# Patient Record
Sex: FEMALE | Race: WHITE | NOT HISPANIC OR LATINO | Employment: OTHER | URBAN - METROPOLITAN AREA
[De-identification: names, ages, dates, MRNs, and addresses within clinical notes are randomized per-mention and may not be internally consistent; named-entity substitution may affect disease eponyms.]

---

## 2021-11-02 PROBLEM — M16.12 PRIMARY OSTEOARTHRITIS OF LEFT HIP: Status: ACTIVE | Noted: 2021-11-02

## 2021-11-02 PROBLEM — S73.192A ACETABULAR LABRUM TEAR, LEFT, INITIAL ENCOUNTER: Status: ACTIVE | Noted: 2021-11-02

## 2021-11-23 PROBLEM — M41.9 SCOLIOSIS OF LUMBAR SPINE: Status: ACTIVE | Noted: 2021-11-23

## 2022-04-19 PROBLEM — S83.232A COMPLEX TEAR OF MEDIAL MENISCUS OF LEFT KNEE AS CURRENT INJURY: Status: ACTIVE | Noted: 2022-04-19

## 2022-04-19 PROBLEM — S93.422A SPRAIN OF DELTOID LIGAMENT OF LEFT ANKLE: Status: ACTIVE | Noted: 2022-04-19

## 2022-05-05 PROBLEM — M71.319 SYNOVIAL CYST OF SHOULDER: Status: ACTIVE | Noted: 2022-05-05

## 2023-07-24 DIAGNOSIS — N39.0 UTI (URINARY TRACT INFECTION): Primary | ICD-10-CM

## 2023-07-31 ENCOUNTER — OFFICE VISIT (OUTPATIENT)
Dept: UROLOGY | Facility: CLINIC | Age: 68
End: 2023-07-31
Payer: MEDICARE

## 2023-07-31 VITALS
RESPIRATION RATE: 18 BRPM | HEART RATE: 76 BPM | WEIGHT: 118 LBS | DIASTOLIC BLOOD PRESSURE: 62 MMHG | SYSTOLIC BLOOD PRESSURE: 124 MMHG | HEIGHT: 66 IN | BODY MASS INDEX: 18.96 KG/M2

## 2023-07-31 DIAGNOSIS — N39.0 RECURRENT UTI (URINARY TRACT INFECTION): Primary | ICD-10-CM

## 2023-07-31 DIAGNOSIS — N39.0 UTI (URINARY TRACT INFECTION): ICD-10-CM

## 2023-07-31 LAB
BILIRUBIN, UA POC OHS: NEGATIVE
BLOOD, UA POC OHS: ABNORMAL
CLARITY, UA POC OHS: ABNORMAL
COLOR, UA POC OHS: YELLOW
GLUCOSE, UA POC OHS: NEGATIVE
KETONES, UA POC OHS: NEGATIVE
LEUKOCYTES, UA POC OHS: ABNORMAL
NITRITE, UA POC OHS: NEGATIVE
PH, UA POC OHS: 5.5
PROTEIN, UA POC OHS: NEGATIVE
SPECIFIC GRAVITY, UA POC OHS: 1.02
UROBILINOGEN, UA POC OHS: 0.2

## 2023-07-31 PROCEDURE — 99204 OFFICE O/P NEW MOD 45 MIN: CPT | Mod: S$GLB,,, | Performed by: FAMILY MEDICINE

## 2023-07-31 PROCEDURE — 81003 URINALYSIS AUTO W/O SCOPE: CPT | Mod: QW,S$GLB,, | Performed by: FAMILY MEDICINE

## 2023-07-31 PROCEDURE — 81003 POCT URINALYSIS(INSTRUMENT): ICD-10-PCS | Mod: QW,S$GLB,, | Performed by: FAMILY MEDICINE

## 2023-07-31 PROCEDURE — 99204 PR OFFICE/OUTPT VISIT, NEW, LEVL IV, 45-59 MIN: ICD-10-PCS | Mod: S$GLB,,, | Performed by: FAMILY MEDICINE

## 2023-07-31 RX ORDER — SULFAMETHOXAZOLE AND TRIMETHOPRIM 800; 160 MG/1; MG/1
1 TABLET ORAL 2 TIMES DAILY
COMMUNITY
Start: 2023-07-21

## 2023-07-31 RX ORDER — ROPINIROLE 0.25 MG/1
0.25 TABLET, FILM COATED ORAL
COMMUNITY
Start: 2023-07-15

## 2023-07-31 RX ORDER — OMEPRAZOLE 40 MG/1
40 CAPSULE, DELAYED RELEASE ORAL
COMMUNITY
Start: 2023-05-29

## 2023-08-01 ENCOUNTER — TELEPHONE (OUTPATIENT)
Dept: UROLOGY | Facility: CLINIC | Age: 68
End: 2023-08-01
Payer: MEDICARE

## 2023-08-01 RX ORDER — NITROFURANTOIN 25; 75 MG/1; MG/1
100 CAPSULE ORAL DAILY
Qty: 30 CAPSULE | Refills: 11 | Status: SHIPPED | OUTPATIENT
Start: 2023-08-01

## 2023-08-01 NOTE — TELEPHONE ENCOUNTER
Spoke with pt and she states renee Gomes wanted her to call and let us know what she was taking from Dr. Crawford, she states it was bactrim. We will send out prophylactic macrobid. Med sent.     ----- Message from Regi Bruno sent at 8/1/2023  1:00 PM CDT -----  Contact: self  Pt is calling to speak with a nurse to give information. Pt has called once before and hasn't got a response . Please call pt at [phone]

## 2024-03-20 ENCOUNTER — OFFICE VISIT (OUTPATIENT)
Dept: PAIN MEDICINE | Facility: CLINIC | Age: 69
End: 2024-03-20
Payer: MEDICARE

## 2024-03-20 VITALS — BODY MASS INDEX: 19.56 KG/M2 | HEIGHT: 66 IN | WEIGHT: 121.69 LBS | OXYGEN SATURATION: 99 %

## 2024-03-20 DIAGNOSIS — M54.2 NECK PAIN: Primary | ICD-10-CM

## 2024-03-20 DIAGNOSIS — R29.3 POOR POSTURE: Primary | ICD-10-CM

## 2024-03-20 DIAGNOSIS — G89.29 CHRONIC BILATERAL LOW BACK PAIN WITHOUT SCIATICA: ICD-10-CM

## 2024-03-20 DIAGNOSIS — G89.29 CHRONIC PAIN OF BOTH SHOULDERS: ICD-10-CM

## 2024-03-20 DIAGNOSIS — M54.2 CHRONIC NECK PAIN: ICD-10-CM

## 2024-03-20 DIAGNOSIS — M54.50 CHRONIC BILATERAL LOW BACK PAIN WITHOUT SCIATICA: ICD-10-CM

## 2024-03-20 DIAGNOSIS — G89.29 INSOMNIA SECONDARY TO CHRONIC PAIN: ICD-10-CM

## 2024-03-20 DIAGNOSIS — G47.01 INSOMNIA SECONDARY TO CHRONIC PAIN: ICD-10-CM

## 2024-03-20 DIAGNOSIS — M25.511 CHRONIC PAIN OF BOTH SHOULDERS: ICD-10-CM

## 2024-03-20 DIAGNOSIS — M54.12 CERVICAL RADICULOPATHY: ICD-10-CM

## 2024-03-20 DIAGNOSIS — M25.512 CHRONIC PAIN OF BOTH SHOULDERS: ICD-10-CM

## 2024-03-20 DIAGNOSIS — G89.29 CHRONIC NECK PAIN: ICD-10-CM

## 2024-03-20 DIAGNOSIS — M47.812 CERVICAL SPONDYLOSIS: ICD-10-CM

## 2024-03-20 PROBLEM — Q21.12 PFO (PATENT FORAMEN OVALE): Status: ACTIVE | Noted: 2020-10-07

## 2024-03-20 PROBLEM — Z96.641 S/P HIP REPLACEMENT, RIGHT: Status: ACTIVE | Noted: 2020-09-10

## 2024-03-20 PROBLEM — I10 HYPERTENSION: Status: ACTIVE | Noted: 2020-10-07

## 2024-03-20 PROCEDURE — 99205 OFFICE O/P NEW HI 60 MIN: CPT | Mod: S$GLB,,, | Performed by: PHYSICAL MEDICINE & REHABILITATION

## 2024-03-20 RX ORDER — AMITRIPTYLINE HYDROCHLORIDE 25 MG/1
25 TABLET, FILM COATED ORAL
COMMUNITY
End: 2024-05-16

## 2024-03-20 RX ORDER — TIZANIDINE 4 MG/1
4-8 TABLET ORAL NIGHTLY PRN
Qty: 60 TABLET | Refills: 2 | Status: SHIPPED | OUTPATIENT
Start: 2024-03-20 | End: 2024-06-03

## 2024-03-20 RX ORDER — SPIRONOLACTONE 25 MG/1
25 TABLET ORAL DAILY
COMMUNITY

## 2024-03-20 NOTE — PROGRESS NOTES
[facility]      Referring Provider: Eloisa Yusuf Np  [address] [place] [address]    Chief Complaint:   Chief Complaint   Patient presents with    Neck Pain       History of Present Illness: Alexsandra Garcia is a 68 y.o. female referred by Eloisa Yusuf for neck pain.      Onset: 1 year. She had prior C7-T1 anterior fusion   Location: neck  Radiation: back and bilateral shoulders  Timing: intermittent  Quality: Aching, Deep, and Stabbing  Exacerbating Factors: general activity  Alleviating Factors: massage and exercise/therapy  Associated Symptoms: She feels weakness in her arms and legs and numbness in both fingertips. She denies night fever/night sweats, urinary incontinence/change in function, bowel incontinence/change in function, and unexplained weight loss    Functional Limitations: Pain is limiting her driving, sleeping, getting dressed.    Patient has failed > 6 weeks of conservative treatment including: physical therapy and oral medications. She has been in PT for about a year.     Severity: Currently: 4/10   Typical Range: 4-10/10     Exacerbation: 10/10     P = 8  E = 6  G = 6  Baseline PEG Score = 6.67  Current PEG Score: 6.67    Opioid Risk Score         Value Time User    Opioid Risk Score  1 3/20/2024  1:21 PM Ketty Ceballos LPN             Previous Interventions:  -     Previous Therapies:  PT/OT: yes   Relevant Surgery: yes    - C7-T1 anterior fusion   Previous Medications:   - NSAIDS: tylenol.  - Muscle Relaxants: tizanidine   - TCAs: Elavil   - SNRIs: duloxetine   - Topicals:   - Anticonvulsants:    - Opioids: Hydrocodone    Current Pain Medications:  Duloxetine 60 mg  Norco  mg  Zanaflex 2 mg BID  Elavil 25 mg     Blood Thinners: none    Full Medication List:    Current Outpatient Medications:     amitriptyline (ELAVIL) 25 MG tablet, 25 mg., Disp: , Rfl:     DULoxetine (CYMBALTA) 60 MG capsule, Take 60 mg by mouth once daily., Disp: , Rfl:     fluticasone  propionate (FLONASE) 50 mcg/actuation nasal spray, by Each Nostril route., Disp: , Rfl:     HYDROcodone-acetaminophen (NORCO)  mg per tablet, hydrocodone 10 mg-acetaminophen 325 mg tablet, Disp: , Rfl:     hydrOXYzine HCL (ATARAX) 25 MG tablet, Take 25 mg by mouth 3 (three) times daily., Disp: , Rfl:     montelukast (SINGULAIR) 10 mg tablet, Take 10 mg by mouth once daily., Disp: , Rfl:     nitrofurantoin, macrocrystal-monohydrate, (MACROBID) 100 MG capsule, Take 1 capsule (100 mg total) by mouth once daily., Disp: 30 capsule, Rfl: 11    omeprazole (PRILOSEC) 40 MG capsule, Take 40 mg by mouth., Disp: , Rfl:     pantoprazole (PROTONIX) 40 MG tablet, pantoprazole 40 mg tablet,delayed release, Disp: , Rfl:     spironolactone (ALDACTONE) 25 MG tablet, Take 25 mg by mouth once daily., Disp: , Rfl:     busPIRone (BUSPAR) 10 MG tablet, Take 10 mg by mouth 2 (two) times daily., Disp: , Rfl:     rOPINIRole (REQUIP) 0.25 MG tablet, Take 0.25 mg by mouth., Disp: , Rfl:     sulfamethoxazole-trimethoprim 400-80mg (BACTRIM,SEPTRA) 400-80 mg per tablet, Take 1 tablet by mouth once daily., Disp: , Rfl:     sulfamethoxazole-trimethoprim 800-160mg (BACTRIM DS) 800-160 mg Tab, Take 1 tablet by mouth 2 (two) times daily., Disp: , Rfl:     tiZANidine (ZANAFLEX) 4 MG tablet, Take 1-2 tablets (4-8 mg total) by mouth nightly as needed (neck pain)., Disp: 60 tablet, Rfl: 2     Review of Systems: See HPI    Allergies:  Pcn [penicillins]     Medical History:   has a past medical history of Anxiety, Arthritis, GERD (gastroesophageal reflux disease), Pulmonary stenosis, and Urinary tract infection.    Surgical History:   has a past surgical history that includes Hip surgery (Right, 2020); Neck surgery (1979); Neck surgery (1983); Foot surgery (Left, 2019); and Elbow surgery (Right, 1980).    Family History:  family history includes Bladder Cancer in her father; Breast cancer in her mother; Cancer in her father and mother.    Social  "History:   reports that she has never smoked. She has never used smokeless tobacco. She reports current alcohol use. She reports current drug use. Drug: Marijuana.    Physical Exam:  Ht 5' 6" (1.676 m)   Wt 55.2 kg (121 lb 11.2 oz)   SpO2 99%   BMI 19.64 kg/m²   GEN: No acute distress. Calm, comfortable  HENT: Normocephalic, atraumatic, moist mucous membranes  EYE: Anicteric sclera, non-injected.   CV: Non-diaphoretic. Regular Rate. Radial Pulses 2+.  RESP: Breathing comfortably. Chest expansion symmetric.  EXT: No clubbing, cyanosis.   SKIN: Warm, & dry to palpation. No visible rashes or lesions of exposed skin.   PSYCH: Pleasant mood and appropriate affect. Recent and remote memory intact.   GAIT: Independent, normal ambulation  Neck Exam:       Inspection: No erythema, bruising. Forward head and rounded shoulders      Palpation: (+) TTP of b/l cervical paraspinals, trapezius, rhomboids, levators, supraspinatus      ROM:  Limitation in all planes      Provocative Maneuvers:  (-) Spurling's bilaterally  Shoulder Exam:       Inspection: No erythema, bruising.       Palpation: (+) TTP of b/l subacromial space, b/l AC joints, b/l proximal biceps tendons.       ROM:  Limited in abduction, internal rotation b/l      Provocative Maneuvers:  (+) Hawkin's b/l       (+) Empty Can b/l  Lumbar Spine Exam:       Inspection: No erythema, bruising.       Palpation: (+) TTP of lumbar paraspinals and SIJ bilaterally       ROM:  Limited in flexion, extension, lateral bending.    Pain w/ flex > ext      (+) Facet loading bilaterally      (-) Straight Leg Raise bilaterally      (-) MORALES bilaterally  Hip Exam:      Inspection: No gross deformity or apparent leg length discrepancy      Palpation:  No TTP to bilateral greater trochanteric bursas, piriformis.       ROM:  No limitation or pain in internal rotation, external rotation b/l  Neurologic Exam:     Alert. Speech is fluent and appropriate.     Strength: 4/5 in b/l hip " flexion, otherwise 5/5 throughout bilateral upper & lower extremities     Sensation:  Grossly intact to light touch in bilateral upper & lower extremities     Reflexes: 1+ in b/l patella, achilles, biceps, brachioradialis, triceps     Tone: No abnormality appreciated in bilateral upper or lower extremities     (-) Jones bilaterally     No Clonus      Imaging:  - MRI Cervical Spine 2/26/24:  Normal anatomic alignment.  Postop changes from C7-T1 ACDF.  Cervical spinal cord shows normal shape, volume and signal.  C1-C2:Anterior longitudinal interval degenerative arthritis and retrograde and ligamentous thickening. C1-C2 facet arthritis  C2-C3:Hypertrophic degenerative facet arthropathy much worse on the right side. 2 mm disc osteophyte bulge. The ligament flavum thickening is effacing the dorsal subarachnoid space and contacting the cord, but there is adequate CSF dorsal to the cord and overall no cord compression. Severe right foraminal stenosis. Mild left foraminal stenosis.  C3-C4:2 mm disc osteophyte bulge and ligament flavum hypertrophy produce mild central canal stenosis without any cord compression. Bilateral hypertrophic degenerative facet arthropathy and uncovertebral joint osteophytes more evident on the right side produces moderate right and mild left foraminal stenosis.  C4-C5:Moderate desiccation and degenerative loss in height of the disc. Left-sided hypertrophic degenerative facet arthropathy and uncovertebral joint osteophyte produces severe left neural foraminal stenosis. No central canal stenosis.  C5-C6:Moderate desiccation and degenerative loss in height of the disc. A 3.5 mm disc osteophyte bulge is slightly asymmetric to the right side along with ligamentum flavum hypertrophy produces mild/moderate central canal stenosis but no cord compression. Very large right uncovertebral joint osteophyte produces severe right neural foraminal stenosis along with degenerative facet arthropathy. Small left  uncovertebral joint osteophyte producing mild left foraminal stenosis.  C6-C7:Mild degenerative loss in height of the disc. 3 mm disc osteophyte bulge producing mild central canal stenosis without any cord compression. Bilateral uncovertebral joint osteophytes and mild hypertrophic degenerative facet arthropathy, worse on the right produces moderate right and mild left foraminal stenosis.  C7-T1:Central canal and neural foramina are well decompressed.  T1-T2: Mild degenerative facet arthropathy without any stenosis.      -MRI Lumbar Spine 12/22/21  Alignment: Levoscoliosis of 19 degrees is seen with the apex being at approximately the L2 level.  Vertebral bodies: Normal height and marrow signal. No transitional anatomy.  T12-L1: The disc is normal. The facets are normal. The central canal and neural foramen are normally maintained. No displacement or compression of the neural elements are seen.  L1-L2: There is loss of disc space height with desiccation. A concentric bulge measuring approximately 3 mm present. Degenerative changes are present in the facets consisting mainly of hypertrophic changes. There is mild narrowing of the central canal. Mild narrowing of the neural foramen is present bilaterally at the disc level. No displacement or compression of the neural elements are seen.  L2-L3: Loss of disc space height with desiccation is present. There is an approximate 2-3 mm concentric disc bulge. Degenerative changes are present in the facets. Moderate narrowing of the central canal is present. There is mild narrowing of the left neural foramen at the disc level. Moderate narrowing of the right neural foramen at both the infrapedicular and disc level is seen. No displacement or compression of the neural elements are seen.  L3-L4: Loss of disc space height with desiccation is present. There is an approximate 4 to 5 mm eccentric disc bulge lateralizing to the patient's right side. Degenerative changes are present in  "the facets. There is moderate narrowing of the central canal. Moderate to severe narrowing of the right subarticular recess is seen. There is mild narrowing of the right left neural foramen at the disc level. The exiting nerve root is normally maintained. Moderate narrowing of the right neural foramen at both the infrapedicular and disc level is seen. The exiting nerve root on this side appears normally maintained as well.  L4-L5: Loss of disc space height with desiccation is present. Eccentric disc bulge lateralizing to the patient's left side is seen. This measures a maximum of approximately 4 to 5 mm. Degenerative changes are present in the facets consisting mainly of hypertrophic changes. There is mild to moderate narrowing of the central canal. Moderate narrowing of the neural foramen is seen on the left at both the  infrapedicular and disc level. The right neural foramen appears fairly well-maintained. No displacement or compression of the neural elements are seen.  L5-S1: Loss of disc space height with desiccation is seen. There is a disc bulge present measuring approximately 5-6 mm. Degenerative changes are present in the facets consisting mainly of hypertrophic changes. There is mild narrowing of the central canal. Moderate narrowing of the neural foramen is present bilaterally at both the infrapedicular and disc level. No displacement or compression of the neural elements are seen.  Spinal cord: The cord extends down to the L1 level. The cord has a normal size, configuration and signal pattern.  Additional findings: None seen.      Labs:  BMP  No results found for: "NA", "K", "CL", "CO2", "BUN", "CREATININE", "CALCIUM", "ANIONGAP", "EGFRNORACEVR"  No results found for: "ALT", "AST", "GGT", "ALKPHOS", "BILITOT"  No results found for: "PLT"    Assessment:  Alexsandra Garcia is a 68 y.o. female with the following diagnoses based on history, exam, and imaging:    Problem List Items Addressed This Visit  "   None  Visit Diagnoses       Poor posture    -  Primary    Relevant Medications    tiZANidine (ZANAFLEX) 4 MG tablet    Other Relevant Orders    Ambulatory referral/consult to Physical/Occupational Therapy    Chronic bilateral low back pain without sciatica        Relevant Medications    tiZANidine (ZANAFLEX) 4 MG tablet    Other Relevant Orders    Ambulatory referral/consult to Physical/Occupational Therapy    X-Ray Lumbar Spine AP And Lateral    Chronic neck pain        Relevant Medications    tiZANidine (ZANAFLEX) 4 MG tablet    Other Relevant Orders    Ambulatory referral/consult to Physical/Occupational Therapy    X-Ray Cervical Spine AP And Lateral    Insomnia secondary to chronic pain        Relevant Medications    tiZANidine (ZANAFLEX) 4 MG tablet    Cervical spondylosis        Relevant Medications    tiZANidine (ZANAFLEX) 4 MG tablet    Other Relevant Orders    X-Ray Cervical Spine AP And Lateral    Cervical radiculopathy        Relevant Medications    tiZANidine (ZANAFLEX) 4 MG tablet    Other Relevant Orders    X-Ray Cervical Spine AP And Lateral    Chronic pain of both shoulders        Relevant Orders    X-Ray Shoulder 2 or more views Bilat (Completed)            This is a pleasant 68 y.o. lady presenting with:     - Chronic neck pain and bilateral shoulder/arm pains: Prior C7-T1 ACDF. There is severe foraminal stenosis on the right at C5-6 and on the left at C4-5 as well as diffuse facet arthropathy, appears most significant at C4-5 on the left and C5-6 on the right.   - Chronic bilateral shoulder pain: Multifactorial with contributions from C-spine as well. She has signs of shoulder impingement, AC joint arthropathy   - Chronic bilateral low back pain and some posterior thigh pain: I suspect her pain is primarily related to her facet joint arthropathy  - Comorbidities: recurrent UTI on daily abx. GERD. HTN. PFO. Anxiety. Pulmonary stenosis.     Treatment Plan:   - PT/OT/HEP: Refer to PT for neck and  back pain. Discussed benefits of exercise for pain.   - Procedures: Schedule C7-T1 IL JANETTE with local sedation.   - If limited relief, plan for bilateral C4-5, C5-6 diagnostic MBBs.  - Medications:    - Change tizanidine from 2 mg TID to 4-8 mg qHS to help with sleep and pain.   - Imaging: Reviewed. X-ray c-spine, l-spine, shoulders.    - Plan on updating lumbar MRI if no improvement with PT  - Labs: None.    Follow Up: RTC 2 weeks after JANETTE    I spent greater than 60 minutes in total in todays visit including face-to-face time with the patient, and time reviewing records/imaging/labs, and documenting.       Marlene Molina MD  Interventional Pain Medicine

## 2024-03-21 ENCOUNTER — TELEPHONE (OUTPATIENT)
Dept: PAIN MEDICINE | Facility: CLINIC | Age: 69
End: 2024-03-21
Payer: MEDICARE

## 2024-03-21 DIAGNOSIS — M54.12 CERVICAL RADICULOPATHY: Primary | ICD-10-CM

## 2024-04-09 ENCOUNTER — TELEPHONE (OUTPATIENT)
Dept: PAIN MEDICINE | Facility: CLINIC | Age: 69
End: 2024-04-09
Payer: MEDICARE

## 2024-04-10 ENCOUNTER — TELEPHONE (OUTPATIENT)
Dept: PAIN MEDICINE | Facility: CLINIC | Age: 69
End: 2024-04-10
Payer: MEDICARE

## 2024-04-10 NOTE — TELEPHONE ENCOUNTER
----- Message from So Payne sent at 4/10/2024 12:19 PM CDT -----  Contact: self  Type:  Patient Returning Call    Who Called:Alexsandra Garcia  Who Left Message for Patient:yin  Does the patient know what this is regarding?:appt reschedule  Would the patient rather a call back or a response via [employer]?   Best Call Back Number:[phone]  Additional Information: n/a

## 2024-04-22 ENCOUNTER — TELEPHONE (OUTPATIENT)
Dept: PAIN MEDICINE | Facility: CLINIC | Age: 69
End: 2024-04-22

## 2024-04-22 NOTE — TELEPHONE ENCOUNTER
[facility]  [address] [place] [address].   [address]  [place] [address]  Phone: [phone]  Fax: [phone]    History & Physical         Provider: Dr. Marlene Molina    Patient Name: Alexsandra DENNEY (age):1955  68 y.o.           Gender: female   Phone: [phone]     Referring Physician:      Subjective: No health changes since previous encounter. No changes in pain since procedure was scheduled at previous visit. Denies any fevers or infections. Denies any issues with clotting or bleeding.           History:      Full Medication List:    Current Outpatient Medications:     amitriptyline (ELAVIL) 25 MG tablet, 25 mg., Disp: , Rfl:     busPIRone (BUSPAR) 10 MG tablet, Take 10 mg by mouth 2 (two) times daily., Disp: , Rfl:     DULoxetine (CYMBALTA) 60 MG capsule, Take 60 mg by mouth once daily., Disp: , Rfl:     fluticasone propionate (FLONASE) 50 mcg/actuation nasal spray, by Each Nostril route., Disp: , Rfl:     HYDROcodone-acetaminophen (NORCO)  mg per tablet, hydrocodone 10 mg-acetaminophen 325 mg tablet, Disp: , Rfl:     hydrOXYzine HCL (ATARAX) 25 MG tablet, Take 25 mg by mouth 3 (three) times daily., Disp: , Rfl:     montelukast (SINGULAIR) 10 mg tablet, Take 10 mg by mouth once daily., Disp: , Rfl:     nitrofurantoin, macrocrystal-monohydrate, (MACROBID) 100 MG capsule, Take 1 capsule (100 mg total) by mouth once daily., Disp: 30 capsule, Rfl: 11    omeprazole (PRILOSEC) 40 MG capsule, Take 40 mg by mouth., Disp: , Rfl:     pantoprazole (PROTONIX) 40 MG tablet, pantoprazole 40 mg tablet,delayed release, Disp: , Rfl:     rOPINIRole (REQUIP) 0.25 MG tablet, Take 0.25 mg by mouth., Disp: , Rfl:     spironolactone (ALDACTONE) 25 MG tablet, Take 25 mg by mouth once daily., Disp: , Rfl:     sulfamethoxazole-trimethoprim 400-80mg (BACTRIM,SEPTRA) 400-80 mg per tablet, Take 1 tablet by mouth once daily.,  Disp: , Rfl:     sulfamethoxazole-trimethoprim 800-160mg (BACTRIM DS) 800-160 mg Tab, Take 1 tablet by mouth 2 (two) times daily., Disp: , Rfl:     tiZANidine (ZANAFLEX) 4 MG tablet, Take 1-2 tablets (4-8 mg total) by mouth nightly as needed (neck pain)., Disp: 60 tablet, Rfl: 2     Allergies:  Pcn [penicillins]     Medical History:   has a past medical history of Anxiety, Arthritis, GERD (gastroesophageal reflux disease), Pulmonary stenosis, and Urinary tract infection.    Surgical History:   has a past surgical history that includes Hip surgery (Right, 2020); Neck surgery (1979); Neck surgery (1983); Foot surgery (Left, 2019); and Elbow surgery (Right, 1980).    Family History:  family history includes Bladder Cancer in her father; Breast cancer in her mother; Cancer in her father and mother.    Social History:   reports that she has never smoked. She has never used smokeless tobacco. She reports current alcohol use. She reports current drug use. Drug: Marijuana.    Physical Examination:     GENERAL: Well appearing, in no acute distress, alert and oriented. If Abnormal: ___________________________________________    PSYCH:  Mood and affect appropriate.     If Abnormal: ___________________________________________    SKIN: Skin color, texture, turgor normal in procedure area   If Abnormal: ___________________________________________  No rashes or lesions which will impact the procedure.    CV: RRR with palpation of the radial artery.     If Abnormal: ___________________________________________    PULM: No evidence of respiratory difficulty, symmetric chest rise.   If Abnormal: ___________________________________________  Clear to auscultation.    NEURO: Alert. Oriented. Speech fluent and appropriate.    If Abnormal: ___________________________________________     Moving all extremities.    Plan:    Patient has been evaluated immediately prior to procedure and no significant changes in pain or significant medical  changes noted at this time that would interfere with our planned procedure.    Proceed with procedure as planned.       Physician Signature: _____________________________________         Date: ___________   Time: ________

## 2024-04-29 ENCOUNTER — OUTSIDE PLACE OF SERVICE (OUTPATIENT)
Dept: PAIN MEDICINE | Facility: CLINIC | Age: 69
End: 2024-04-29

## 2024-04-29 PROCEDURE — 62321 NJX INTERLAMINAR CRV/THRC: CPT | Mod: ,,, | Performed by: PHYSICAL MEDICINE & REHABILITATION

## 2024-05-16 ENCOUNTER — OFFICE VISIT (OUTPATIENT)
Dept: PAIN MEDICINE | Facility: CLINIC | Age: 69
End: 2024-05-16
Payer: MEDICARE

## 2024-05-16 VITALS
DIASTOLIC BLOOD PRESSURE: 68 MMHG | SYSTOLIC BLOOD PRESSURE: 114 MMHG | WEIGHT: 121.69 LBS | HEIGHT: 66 IN | HEART RATE: 76 BPM | BODY MASS INDEX: 19.56 KG/M2

## 2024-05-16 DIAGNOSIS — G89.29 CHRONIC LEFT-SIDED LOW BACK PAIN WITHOUT SCIATICA: Primary | ICD-10-CM

## 2024-05-16 DIAGNOSIS — M54.2 NECK PAIN: ICD-10-CM

## 2024-05-16 DIAGNOSIS — M53.3 SACROILIAC JOINT DYSFUNCTION: ICD-10-CM

## 2024-05-16 DIAGNOSIS — M47.816 LUMBAR SPONDYLOSIS: ICD-10-CM

## 2024-05-16 DIAGNOSIS — M43.16 SPONDYLOLISTHESIS OF LUMBAR REGION: ICD-10-CM

## 2024-05-16 DIAGNOSIS — M54.50 CHRONIC LEFT-SIDED LOW BACK PAIN WITHOUT SCIATICA: Primary | ICD-10-CM

## 2024-05-16 PROBLEM — I35.8 OTHER NONRHEUMATIC AORTIC VALVE DISORDERS: Status: ACTIVE | Noted: 2024-05-07

## 2024-05-16 PROBLEM — Q24.9 ADULT CONGENITAL HEART DISEASE: Status: ACTIVE | Noted: 2024-04-30

## 2024-05-16 PROBLEM — Q21.11 SECUNDUM ATRIAL SEPTAL DEFECT: Status: ACTIVE | Noted: 2024-05-07

## 2024-05-16 PROBLEM — I07.1 SEVERE TRICUSPID REGURGITATION: Status: ACTIVE | Noted: 2024-03-21

## 2024-05-16 PROBLEM — Q25.6 CONGENITAL PULMONARY STENOSIS: Status: ACTIVE | Noted: 2024-03-21

## 2024-05-16 PROCEDURE — 99214 OFFICE O/P EST MOD 30 MIN: CPT | Mod: S$GLB,,, | Performed by: PHYSICAL MEDICINE & REHABILITATION

## 2024-05-16 RX ORDER — BACLOFEN 5 MG/1
5 TABLET ORAL 2 TIMES DAILY
COMMUNITY
Start: 2024-04-24

## 2024-05-16 RX ORDER — VIT C/E/ZN/COPPR/LUTEIN/ZEAXAN 250MG-90MG
1000 CAPSULE ORAL DAILY
COMMUNITY

## 2024-05-16 RX ORDER — ROSUVASTATIN CALCIUM 20 MG/1
20 TABLET, COATED ORAL DAILY
COMMUNITY
Start: 2024-03-27 | End: 2025-03-27

## 2024-05-16 RX ORDER — SUCRALFATE 1 G/1
1 TABLET ORAL
COMMUNITY
Start: 2024-01-19

## 2024-05-16 NOTE — PROGRESS NOTES
[facility]      Referring Provider: Juan Francisco Ko Ii, Md  [address] [place] [address]    Chief Complaint:   Chief Complaint   Patient presents with    Neck Pain       History of Present Illness: Alexsandra Garcia is a 68 y.o. female referred by Eloisa Yusuf for neck pain.      Onset: 1 year. She had prior C7-T1 anterior fusion   Location: neck  Radiation: back and bilateral shoulders  Timing: intermittent  Quality: Aching, Deep, and Stabbing  Exacerbating Factors: general activity  Alleviating Factors: massage and exercise/therapy  Associated Symptoms: She feels weakness in her arms and legs and numbness in both fingertips. She denies night fever/night sweats, urinary incontinence/change in function, bowel incontinence/change in function, and unexplained weight loss    Functional Limitations: Pain is limiting her driving, sleeping, getting dressed.    Patient has failed > 6 weeks of conservative treatment including: physical therapy and oral medications. She has been in PT for about a year.     Severity: Currently: 4/10   Typical Range: 4-10/10     Exacerbation: 10/10     P = 8  E = 6  G = 6  Baseline PEG Score = 6.67    Interval History (05/16/2024):  Alexsandra Garcia returns today for follow up.  At the last clinic visit, Schedule C7-T1 IL JANETTE and refer to PT.    C7-T1 IL JANETTE  provided 90% relief.     PT provides 50% relief.    Currently, the neck pain is much improved.      She is still having pain in the left low back in the paraspinals and down into the left SIJ. PT does seem to be helping.    She is being assessed for septal defect causing fatigue in [facility].     Current Pain Scales:  Current: 2/10              Typical Range: 2-3/10     Current PEG Score: 2.67    Opioid Risk Score         Value Time User    Opioid Risk Score  1 3/20/2024  1:21 PM Ketty Ceballos LPN             Previous Interventions:  - 4/29/24: C7-T1 IL JANETTE with 90% relief    Previous Therapies:  PT/OT:  yes   Relevant Surgery: yes    - C7-T1 anterior fusion    - Right hip replacement  Previous Medications:   - NSAIDS: tylenol.  - Muscle Relaxants: tizanidine   - TCAs: Elavil   - SNRIs: duloxetine   - Topicals:   - Anticonvulsants:    - Opioids: Hydrocodone    Current Pain Medications:  Duloxetine 60 mg  Norco  mg  Zanaflex 2 mg BID  Elavil 25 mg     Blood Thinners: none    Full Medication List:    Current Outpatient Medications:     cholecalciferol, vitamin D3, (VITAMIN D3) 25 mcg (1,000 unit) capsule, Take 1,000 Units by mouth once daily., Disp: , Rfl:     DULoxetine (CYMBALTA) 60 MG capsule, Take 60 mg by mouth once daily., Disp: , Rfl:     fluticasone propionate (FLONASE) 50 mcg/actuation nasal spray, by Each Nostril route., Disp: , Rfl:     HYDROcodone-acetaminophen (NORCO)  mg per tablet, hydrocodone 10 mg-acetaminophen 325 mg tablet, Disp: , Rfl:     hydrOXYzine HCL (ATARAX) 25 MG tablet, Take 25 mg by mouth 3 (three) times daily., Disp: , Rfl:     omeprazole (PRILOSEC) 40 MG capsule, Take 40 mg by mouth., Disp: , Rfl:     rosuvastatin (CRESTOR) 20 MG tablet, Take 20 mg by mouth once daily., Disp: , Rfl:     spironolactone (ALDACTONE) 25 MG tablet, Take 25 mg by mouth once daily., Disp: , Rfl:     sucralfate (CARAFATE) 1 gram tablet, Take 1 g by mouth., Disp: , Rfl:     tiZANidine (ZANAFLEX) 4 MG tablet, Take 1-2 tablets (4-8 mg total) by mouth nightly as needed (neck pain)., Disp: 60 tablet, Rfl: 2    baclofen (LIORESAL) 5 mg Tab tablet, Take 5 mg by mouth 2 (two) times daily. (Patient not taking: Reported on 5/16/2024), Disp: , Rfl:      Review of Systems: See HPI    Allergies:  Pcn [penicillins]     Medical History:   has a past medical history of Anxiety, Arthritis, GERD (gastroesophageal reflux disease), Pulmonary stenosis, and Urinary tract infection.    Surgical History:   has a past surgical history that includes Hip surgery (Right, 2020); Neck surgery (1979); Neck surgery (1983); Foot  "surgery (Left, 2019); and Elbow surgery (Right, 1980).    Family History:  family history includes Bladder Cancer in her father; Breast cancer in her mother; Cancer in her father and mother.    Social History:   reports that she has never smoked. She has never used smokeless tobacco. She reports current alcohol use. She reports current drug use. Drug: Marijuana.    Physical Exam:  /68   Pulse 76   Ht 5' 6" (1.676 m)   Wt 55.2 kg (121 lb 11.1 oz)   BMI 19.64 kg/m²   GEN: No acute distress. Calm, comfortable  HENT: Normocephalic, atraumatic, moist mucous membranes  EYE: Anicteric sclera, non-injected.   CV: Non-diaphoretic. Regular Rate. Radial Pulses 2+.  RESP: Breathing comfortably. Chest expansion symmetric.  EXT: No clubbing, cyanosis.   SKIN: Warm, & dry to palpation. No visible rashes or lesions of exposed skin.   PSYCH: Pleasant mood and appropriate affect. Recent and remote memory intact.   GAIT: Independent, normal ambulation  Neck Exam:       Inspection: No erythema, bruising. Forward head and rounded shoulders      Palpation: (+) TTP of b/l cervical paraspinals, trapezius, rhomboids, levators, supraspinatus      ROM:  Limitation in all planes      Provocative Maneuvers:  (-) Spurling's bilaterally  Shoulder Exam:       Inspection: No erythema, bruising.       Palpation: (+) TTP of b/l subacromial space, b/l AC joints, b/l proximal biceps tendons.       ROM:  Limited in abduction, internal rotation b/l      Provocative Maneuvers:  (+) Hawkin's b/l       (+) Empty Can b/l  Lumbar Spine Exam:       Inspection: No erythema, bruising.       Palpation: (+) TTP of lumbar paraspinals and SIJ on left       ROM:  Limited in flexion, extension, lateral bending.    Pain w/ flex > ext      (+) Facet loading bilaterally      (-) Straight Leg Raise bilaterally      (-) MORALES bilaterally  Hip Exam:      Inspection: No gross deformity or apparent leg length discrepancy      Palpation:  No TTP to bilateral greater " trochanteric bursas, piriformis.       ROM:  No limitation or pain in internal rotation, external rotation b/l  Neurologic Exam:     Alert. Speech is fluent and appropriate.     Strength: 4/5 in b/l hip flexion, otherwise 5/5 throughout bilateral upper & lower extremities     Sensation:  Grossly intact to light touch in bilateral upper & lower extremities     Reflexes: 1+ in b/l patella, achilles, biceps, brachioradialis, triceps     Tone: No abnormality appreciated in bilateral upper or lower extremities     (-) Jones bilaterally     No Clonus      Imaging:  - X-ray lumbar spine complete w/ flex/ext 5/16/24:  There is mild levoscoliosis of the lumbar spine. Vertebral body heights are within normal limits. There is slight retrolisthesis of L2 on L3 and L3 on L4. There is moderate disc height loss from L2-3 through L5-S1. Mild disc height loss at L1-2. Multilevel facet arthropathy suspected. No fractures visualized. Visualized osseous structures are diffusely osteopenic. Prior right hip arthroplasty noted.     - X-ray lumbar spine 3/20/24:  There is mild levoscoliosis of the lumbar spine. Vertebral body heights are within normal limits. There is slight retrolisthesis of L2 on L3 and L3 on L4. There is moderate disc height loss from L2-3 through L5-S1. Mild disc height loss at L1-2. Multilevel facet arthropathy suspected. No fractures visualized. Visualized osseous structures are diffusely osteopenic. Prior right hip arthroplasty noted.     - X-ray cervical spine 3/20/24:  The patient is status post ACDF of C7-T1. Cervical lordosis appears somewhat accentuated. Vertebral body heights are within normal limits. There is slight retrolisthesis of C5 on C6. Moderate to severe disc height loss at C5-6. Mild-to-moderate disc height loss at C6-7. Probable mild disc height loss at C2-3 and C4-5. Multilevel bilateral facet arthropathy suspected. No fractures demonstrated. Odontoid process appears intact. Atlantoaxial  articulations appear normal. Visualized osseous structures are diffusely osteopenic. Prevertebral soft tissues appear within normal limits.     - MRI Cervical Spine 2/26/24:  Normal anatomic alignment.  Postop changes from C7-T1 ACDF.  Cervical spinal cord shows normal shape, volume and signal.  C1-C2:Anterior longitudinal interval degenerative arthritis and retrograde and ligamentous thickening. C1-C2 facet arthritis  C2-C3:Hypertrophic degenerative facet arthropathy much worse on the right side. 2 mm disc osteophyte bulge. The ligament flavum thickening is effacing the dorsal subarachnoid space and contacting the cord, but there is adequate CSF dorsal to the cord and overall no cord compression. Severe right foraminal stenosis. Mild left foraminal stenosis.  C3-C4:2 mm disc osteophyte bulge and ligament flavum hypertrophy produce mild central canal stenosis without any cord compression. Bilateral hypertrophic degenerative facet arthropathy and uncovertebral joint osteophytes more evident on the right side produces moderate right and mild left foraminal stenosis.  C4-C5:Moderate desiccation and degenerative loss in height of the disc. Left-sided hypertrophic degenerative facet arthropathy and uncovertebral joint osteophyte produces severe left neural foraminal stenosis. No central canal stenosis.  C5-C6:Moderate desiccation and degenerative loss in height of the disc. A 3.5 mm disc osteophyte bulge is slightly asymmetric to the right side along with ligamentum flavum hypertrophy produces mild/moderate central canal stenosis but no cord compression. Very large right uncovertebral joint osteophyte produces severe right neural foraminal stenosis along with degenerative facet arthropathy. Small left uncovertebral joint osteophyte producing mild left foraminal stenosis.  C6-C7:Mild degenerative loss in height of the disc. 3 mm disc osteophyte bulge producing mild central canal stenosis without any cord compression.  Bilateral uncovertebral joint osteophytes and mild hypertrophic degenerative facet arthropathy, worse on the right produces moderate right and mild left foraminal stenosis.  C7-T1:Central canal and neural foramina are well decompressed.  T1-T2: Mild degenerative facet arthropathy without any stenosis.      -MRI Lumbar Spine 12/22/21  Alignment: Levoscoliosis of 19 degrees is seen with the apex being at approximately the L2 level.  Vertebral bodies: Normal height and marrow signal. No transitional anatomy.  T12-L1: The disc is normal. The facets are normal. The central canal and neural foramen are normally maintained. No displacement or compression of the neural elements are seen.  L1-L2: There is loss of disc space height with desiccation. A concentric bulge measuring approximately 3 mm present. Degenerative changes are present in the facets consisting mainly of hypertrophic changes. There is mild narrowing of the central canal. Mild narrowing of the neural foramen is present bilaterally at the disc level. No displacement or compression of the neural elements are seen.  L2-L3: Loss of disc space height with desiccation is present. There is an approximate 2-3 mm concentric disc bulge. Degenerative changes are present in the facets. Moderate narrowing of the central canal is present. There is mild narrowing of the left neural foramen at the disc level. Moderate narrowing of the right neural foramen at both the infrapedicular and disc level is seen. No displacement or compression of the neural elements are seen.  L3-L4: Loss of disc space height with desiccation is present. There is an approximate 4 to 5 mm eccentric disc bulge lateralizing to the patient's right side. Degenerative changes are present in the facets. There is moderate narrowing of the central canal. Moderate to severe narrowing of the right subarticular recess is seen. There is mild narrowing of the right left neural foramen at the disc level. The  "exiting nerve root is normally maintained. Moderate narrowing of the right neural foramen at both the infrapedicular and disc level is seen. The exiting nerve root on this side appears normally maintained as well.  L4-L5: Loss of disc space height with desiccation is present. Eccentric disc bulge lateralizing to the patient's left side is seen. This measures a maximum of approximately 4 to 5 mm. Degenerative changes are present in the facets consisting mainly of hypertrophic changes. There is mild to moderate narrowing of the central canal. Moderate narrowing of the neural foramen is seen on the left at both the  infrapedicular and disc level. The right neural foramen appears fairly well-maintained. No displacement or compression of the neural elements are seen.  L5-S1: Loss of disc space height with desiccation is seen. There is a disc bulge present measuring approximately 5-6 mm. Degenerative changes are present in the facets consisting mainly of hypertrophic changes. There is mild narrowing of the central canal. Moderate narrowing of the neural foramen is present bilaterally at both the infrapedicular and disc level. No displacement or compression of the neural elements are seen.  Spinal cord: The cord extends down to the L1 level. The cord has a normal size, configuration and signal pattern.  Additional findings: None seen.      Labs:  BMP  No results found for: "NA", "K", "CL", "CO2", "BUN", "CREATININE", "CALCIUM", "ANIONGAP", "EGFRNORACEVR"  No results found for: "ALT", "AST", "GGT", "ALKPHOS", "BILITOT"  No results found for: "PLT"    Assessment:  Alexsandra Garcia is a 68 y.o. female with the following diagnoses based on history, exam, and imaging:    Problem List Items Addressed This Visit    None  Visit Diagnoses       Chronic left-sided low back pain without sciatica    -  Primary    Relevant Orders    X-Ray Lumbar Complete Including Flex And Ext    Neck pain        Sacroiliac joint dysfunction        " Lumbar spondylosis        Relevant Orders    X-Ray Lumbar Complete Including Flex And Ext    Spondylolisthesis of lumbar region        Relevant Orders    X-Ray Lumbar Complete Including Flex And Ext            This is a pleasant 68 y.o. lady presenting with:     - Chronic neck pain and bilateral shoulder/arm pains: Prior C7-T1 ACDF. There is severe foraminal stenosis on the right at C5-6 and on the left at C4-5 as well as diffuse facet arthropathy, appears most significant at C4-5 on the left and C5-6 on the right.   - Chronic bilateral shoulder pain: Multifactorial with contributions from C-spine as well. She has signs of shoulder impingement, AC joint arthropathy   - Chronic bilateral low back pain and some posterior thigh pain: I suspect her pain is primarily related to her facet joint arthropathy. There is pain localized over SIJ, but negative MORALES b/l.    - Pain primarily over left low back currently.   - Comorbidities: Recurrent UTI on daily abx. GERD. HTN. Atrial septal defect. Anxiety. Pulmonary stenosis.     Treatment Plan:   - PT/OT/HEP: Cont PT for neck and back pain. Discussed benefits of exercise for pain.   - Procedures:   - Consider left vs bilateral L4-5, L5-S1 diagnostic MBBs for back pain  - Can repeat C7-T1 IL JANETTE with local sedation PRN   - If limited relief, plan for bilateral C4-5, C5-6 diagnostic MBBs.  - Medications:    - Cont tizanidine 4-8 mg qHS to help with sleep and pain.   - Imaging: Reviewed. X-ray l-spine with flex/ext.    - Plan on updating lumbar MRI if no improvement with PT  - Labs: None.    Follow Up: RTC 3 months or sooner PRN        Marlene Molina MD  Interventional Pain Medicine

## 2024-06-01 DIAGNOSIS — G47.01 INSOMNIA SECONDARY TO CHRONIC PAIN: ICD-10-CM

## 2024-06-01 DIAGNOSIS — M54.12 CERVICAL RADICULOPATHY: ICD-10-CM

## 2024-06-01 DIAGNOSIS — R29.3 POOR POSTURE: ICD-10-CM

## 2024-06-01 DIAGNOSIS — G89.29 INSOMNIA SECONDARY TO CHRONIC PAIN: ICD-10-CM

## 2024-06-01 DIAGNOSIS — G89.29 CHRONIC NECK PAIN: ICD-10-CM

## 2024-06-01 DIAGNOSIS — M47.812 CERVICAL SPONDYLOSIS: ICD-10-CM

## 2024-06-01 DIAGNOSIS — M54.50 CHRONIC BILATERAL LOW BACK PAIN WITHOUT SCIATICA: ICD-10-CM

## 2024-06-01 DIAGNOSIS — G89.29 CHRONIC BILATERAL LOW BACK PAIN WITHOUT SCIATICA: ICD-10-CM

## 2024-06-01 DIAGNOSIS — M54.2 CHRONIC NECK PAIN: ICD-10-CM

## 2024-06-03 RX ORDER — TIZANIDINE 4 MG/1
TABLET ORAL
Qty: 60 TABLET | Refills: 2 | Status: SHIPPED | OUTPATIENT
Start: 2024-06-03

## 2024-08-19 ENCOUNTER — TELEPHONE (OUTPATIENT)
Dept: PAIN MEDICINE | Facility: CLINIC | Age: 69
End: 2024-08-19
Payer: MEDICARE

## 2024-08-19 NOTE — TELEPHONE ENCOUNTER
----- Message from Carly Mario sent at 8/19/2024  1:05 PM CDT -----  Contact: Alexsandra  Type:  Sooner Apoointment Request    Caller is requesting a sooner appointment. Caller will not accept being placed on the waitlist and is requesting a message be sent to doctor.  Name of Caller:Alexsandra  When is the first available appointment?scheduled 8/21/24  Symptoms: 3-4 month follow-up neck pain  Would the patient rather a call back or a response via [facility]? call  Best Call Back Number:[phone]   Additional Information: Patient request to reschedule visit for after 1:30 pm. Please give patient a call back to assist.   Thank you,  GH

## 2024-09-03 ENCOUNTER — OFFICE VISIT (OUTPATIENT)
Dept: PAIN MEDICINE | Facility: CLINIC | Age: 69
End: 2024-09-03
Payer: MEDICARE

## 2024-09-03 VITALS
BODY MASS INDEX: 19.39 KG/M2 | WEIGHT: 120.69 LBS | DIASTOLIC BLOOD PRESSURE: 73 MMHG | OXYGEN SATURATION: 98 % | HEART RATE: 72 BPM | SYSTOLIC BLOOD PRESSURE: 113 MMHG | HEIGHT: 66 IN

## 2024-09-03 DIAGNOSIS — M75.42 IMPINGEMENT SYNDROME OF SHOULDER, LEFT: ICD-10-CM

## 2024-09-03 DIAGNOSIS — M54.12 CERVICAL RADICULOPATHY: ICD-10-CM

## 2024-09-03 DIAGNOSIS — M47.812 CERVICAL SPONDYLOSIS: ICD-10-CM

## 2024-09-03 DIAGNOSIS — R29.3 POOR POSTURE: ICD-10-CM

## 2024-09-03 DIAGNOSIS — M54.50 CHRONIC BILATERAL LOW BACK PAIN WITHOUT SCIATICA: Primary | ICD-10-CM

## 2024-09-03 DIAGNOSIS — G47.01 INSOMNIA SECONDARY TO CHRONIC PAIN: ICD-10-CM

## 2024-09-03 DIAGNOSIS — M25.511 CHRONIC PAIN OF BOTH SHOULDERS: ICD-10-CM

## 2024-09-03 DIAGNOSIS — G89.29 CHRONIC PAIN OF BOTH SHOULDERS: ICD-10-CM

## 2024-09-03 DIAGNOSIS — G89.29 CHRONIC NECK PAIN: ICD-10-CM

## 2024-09-03 DIAGNOSIS — M25.512 CHRONIC PAIN OF BOTH SHOULDERS: ICD-10-CM

## 2024-09-03 DIAGNOSIS — G89.29 CHRONIC BILATERAL LOW BACK PAIN WITHOUT SCIATICA: Primary | ICD-10-CM

## 2024-09-03 DIAGNOSIS — M54.2 CHRONIC NECK PAIN: ICD-10-CM

## 2024-09-03 DIAGNOSIS — G89.29 INSOMNIA SECONDARY TO CHRONIC PAIN: ICD-10-CM

## 2024-09-03 RX ORDER — FAMOTIDINE 40 MG/1
TABLET, FILM COATED ORAL
COMMUNITY
Start: 2024-08-16

## 2024-09-03 RX ORDER — LIDOCAINE HYDROCHLORIDE 10 MG/ML
2 INJECTION, SOLUTION INFILTRATION; PERINEURAL
Status: DISCONTINUED | OUTPATIENT
Start: 2024-09-03 | End: 2024-09-03 | Stop reason: HOSPADM

## 2024-09-03 RX ORDER — TIZANIDINE 4 MG/1
2-4 TABLET ORAL NIGHTLY PRN
Qty: 60 TABLET | Refills: 2 | Status: SHIPPED | OUTPATIENT
Start: 2024-09-03

## 2024-09-03 RX ORDER — BACLOFEN 5 MG/1
5 TABLET ORAL 2 TIMES DAILY PRN
Qty: 60 TABLET | Refills: 2 | Status: SHIPPED | OUTPATIENT
Start: 2024-09-03

## 2024-09-03 RX ORDER — NITROFURANTOIN 25; 75 MG/1; MG/1
CAPSULE ORAL
COMMUNITY
Start: 2024-08-14

## 2024-09-03 RX ORDER — METHYLPREDNISOLONE ACETATE 40 MG/ML
40 INJECTION, SUSPENSION INTRA-ARTICULAR; INTRALESIONAL; INTRAMUSCULAR; SOFT TISSUE
Status: DISCONTINUED | OUTPATIENT
Start: 2024-09-03 | End: 2024-09-03 | Stop reason: HOSPADM

## 2024-09-03 RX ORDER — PANTOPRAZOLE SODIUM 40 MG/1
40 TABLET, DELAYED RELEASE ORAL
COMMUNITY

## 2024-09-03 RX ADMIN — METHYLPREDNISOLONE ACETATE 40 MG: 40 INJECTION, SUSPENSION INTRA-ARTICULAR; INTRALESIONAL; INTRAMUSCULAR; SOFT TISSUE at 01:09

## 2024-09-03 RX ADMIN — LIDOCAINE HYDROCHLORIDE 2 ML: 10 INJECTION, SOLUTION INFILTRATION; PERINEURAL at 01:09

## 2024-09-03 NOTE — PROGRESS NOTES
[facility]      Chief Complaint:   Chief Complaint   Patient presents with    Follow-up    Neck Pain       History of Present Illness: Alexsandra Garcia is a 69 y.o. female referred by Eloisa Yusuf for neck pain.      Onset: 1 year. She had prior C7-T1 anterior fusion   Location: neck  Radiation: back and bilateral shoulders  Timing: intermittent  Quality: Aching, Deep, and Stabbing  Exacerbating Factors: general activity  Alleviating Factors: massage and exercise/therapy  Associated Symptoms: She feels weakness in her arms and legs and numbness in both fingertips. She denies night fever/night sweats, urinary incontinence/change in function, bowel incontinence/change in function, and unexplained weight loss    Functional Limitations: Pain is limiting her driving, sleeping, getting dressed.    Patient has failed > 6 weeks of conservative treatment including: physical therapy and oral medications. She has been in PT for about a year.     Severity: Currently: 4/10   Typical Range: 4-10/10     Exacerbation: 10/10     P = 8  E = 6  G = 6  Baseline PEG Score = 6.67    Interval History (05/16/2024):  Alexsandra Garcia returns today for follow up.  At the last clinic visit, Schedule C7-T1 IL JANETTE and refer to PT.    C7-T1 IL JANETTE  provided 90% relief.     PT provides 50% relief.    Currently, the neck pain is much improved.      She is still having pain in the left low back in the paraspinals and down into the left SIJ. PT does seem to be helping.    She is being assessed for septal defect causing fatigue in [facility].     Current Pain Scales:  Current: 2/10              Typical Range: 2-3/10       Interval History (09/03/2024):  Alexsandra Garcia returns today for follow up.  At the last clinic visit, referred to PT, ordered x-ray.     Currently, the neck pain is improved.  However, her back pain is worse. Back pain in bilateral low back and does not radiate into legs. Pain worse with flexion more than  with extension. No new weakness or numbness. No changes in b/b function. She is also still having left shoulder pain.       Current Pain Scales:  Current: 6/10              Typical Range: -3-6/10     Current PEG Score: 8.67    Opioid Risk Score         Value Time User    Opioid Risk Score  1 3/20/2024  1:21 PM Ketty Ceballos LPN             Previous Interventions:  - 4/29/24: C7-T1 IL JANETTE with 90% relief    Previous Therapies:  PT/OT: yes   Relevant Surgery: yes    - C7-T1 anterior fusion    - Right hip replacement  Previous Medications:   - NSAIDS: tylenol.  - Muscle Relaxants: tizanidine   - TCAs: Elavil   - SNRIs: duloxetine   - Topicals:   - Anticonvulsants:    - Opioids: Hydrocodone    Current Pain Medications:  Duloxetine 60 mg  Norco  mg  Zanaflex 2 mg BID  Elavil 25 mg     Blood Thinners: none    Full Medication List:    Current Outpatient Medications:     famotidine (PEPCID) 40 MG tablet, , Disp: , Rfl:     nitrofurantoin, macrocrystal-monohydrate, (MACROBID) 100 MG capsule, , Disp: , Rfl:     baclofen (LIORESAL) 5 mg Tab tablet, Take 1 tablet (5 mg total) by mouth 2 (two) times daily as needed (back pain)., Disp: 60 tablet, Rfl: 2    cholecalciferol, vitamin D3, (VITAMIN D3) 25 mcg (1,000 unit) capsule, Take 1,000 Units by mouth once daily., Disp: , Rfl:     DULoxetine (CYMBALTA) 60 MG capsule, Take 60 mg by mouth once daily., Disp: , Rfl:     fluticasone propionate (FLONASE) 50 mcg/actuation nasal spray, by Each Nostril route., Disp: , Rfl:     HYDROcodone-acetaminophen (NORCO)  mg per tablet, hydrocodone 10 mg-acetaminophen 325 mg tablet, Disp: , Rfl:     hydrOXYzine HCL (ATARAX) 25 MG tablet, Take 25 mg by mouth 3 (three) times daily., Disp: , Rfl:     multivitamin (THERAGRAN) tablet, Take 1 tablet by mouth once daily., Disp: , Rfl:     pantoprazole (PROTONIX) 40 MG tablet, Take 40 mg by mouth., Disp: , Rfl:     rosuvastatin (CRESTOR) 20 MG tablet, Take 20 mg by mouth once daily., Disp: ,  "Rfl:     spironolactone (ALDACTONE) 25 MG tablet, Take 25 mg by mouth once daily., Disp: , Rfl:     tiZANidine (ZANAFLEX) 4 MG tablet, Take 0.5-1 tablets (2-4 mg total) by mouth nightly as needed (spasm, pain)., Disp: 60 tablet, Rfl: 2     Review of Systems: See HPI    Allergies:  Pcn [penicillins]     Medical History:   has a past medical history of Anxiety, Arthritis, GERD (gastroesophageal reflux disease), Pulmonary stenosis, and Urinary tract infection.    Surgical History:   has a past surgical history that includes Hip surgery (Right, 2020); Neck surgery (1979); Neck surgery (1983); Foot surgery (Left, 2019); and Elbow surgery (Right, 1980).    Family History:  family history includes Bladder Cancer in her father; Breast cancer in her mother; Cancer in her father and mother.    Social History:   reports that she has never smoked. She has never used smokeless tobacco. She reports current alcohol use. She reports current drug use. Drug: Marijuana.    Physical Exam:  /73 (BP Location: Left arm, Patient Position: Sitting, BP Method: Medium (Automatic))   Pulse 72   Ht 5' 6" (1.676 m)   Wt 54.7 kg (120 lb 11.2 oz)   SpO2 98%   BMI 19.48 kg/m²   GEN: No acute distress. Calm, comfortable  HENT: Normocephalic, atraumatic, moist mucous membranes  EYE: Anicteric sclera, non-injected.   CV: Non-diaphoretic. Regular Rate. Radial Pulses 2+.  RESP: Breathing comfortably. Chest expansion symmetric.  EXT: No clubbing, cyanosis.   SKIN: Warm, & dry to palpation. No visible rashes or lesions of exposed skin.   PSYCH: Pleasant mood and appropriate affect. Recent and remote memory intact.   GAIT: Independent, normal ambulation  Neck Exam:       Inspection: No erythema, bruising. Forward head and rounded shoulders      Palpation: (+) TTP of b/l cervical paraspinals, trapezius, rhomboids, levators, supraspinatus      ROM:  Limitation in all planes      Provocative Maneuvers:  (-) Spurling's bilaterally  Shoulder Exam:    "    Inspection: No erythema, bruising.       Palpation: (+) TTP of b/l subacromial space, b/l AC joints, b/l proximal biceps tendons.       ROM:  Limited in abduction, internal rotation b/l      Provocative Maneuvers:  (+) Hawkin's b/l       (+) Empty Can b/l  Lumbar Spine Exam:       Inspection: No erythema, bruising.       Palpation: (+) TTP of lumbar paraspinals and SIJ b/l      ROM:  Limited in flexion, extension, lateral bending.    Pain w/ flex > ext      (+) Facet loading bilaterally      (-) Straight Leg Raise bilaterally      (-) MORALES bilaterally  (+) Gaenslan's Test bilaterally  (+) Thigh thrust/Posterior Pelvic Pain Provocation test on right  (+) Sacral thrust w/ pain bilaterally   (+) Yeoman's test bilaterally  Hip Exam:      Inspection: No gross deformity or apparent leg length discrepancy      Palpation:  No TTP to bilateral greater trochanteric bursas, piriformis.       ROM:  No limitation or pain in internal rotation, external rotation b/l  Neurologic Exam:     Alert. Speech is fluent and appropriate.     Strength: 4/5 in b/l hip flexion, otherwise 5/5 throughout bilateral upper & lower extremities     Sensation:  Grossly intact to light touch in bilateral upper & lower extremities     Reflexes: 1+ in b/l patella, achilles, biceps, brachioradialis, triceps     Tone: No abnormality appreciated in bilateral upper or lower extremities     (-) Jones bilaterally     No Clonus      Imaging:  - X-ray lumbar spine complete w/ flex/ext 5/16/24:  There is mild levoscoliosis of the lumbar spine. Vertebral body heights are within normal limits. There is slight retrolisthesis of L2 on L3 and L3 on L4. There is moderate disc height loss from L2-3 through L5-S1. Mild disc height loss at L1-2. Multilevel facet arthropathy suspected. No fractures visualized. Visualized osseous structures are diffusely osteopenic. Prior right hip arthroplasty noted.     - X-ray lumbar spine 3/20/24:  There is mild levoscoliosis of  the lumbar spine. Vertebral body heights are within normal limits. There is slight retrolisthesis of L2 on L3 and L3 on L4. There is moderate disc height loss from L2-3 through L5-S1. Mild disc height loss at L1-2. Multilevel facet arthropathy suspected. No fractures visualized. Visualized osseous structures are diffusely osteopenic. Prior right hip arthroplasty noted.     - X-ray cervical spine 3/20/24:  The patient is status post ACDF of C7-T1. Cervical lordosis appears somewhat accentuated. Vertebral body heights are within normal limits. There is slight retrolisthesis of C5 on C6. Moderate to severe disc height loss at C5-6. Mild-to-moderate disc height loss at C6-7. Probable mild disc height loss at C2-3 and C4-5. Multilevel bilateral facet arthropathy suspected. No fractures demonstrated. Odontoid process appears intact. Atlantoaxial articulations appear normal. Visualized osseous structures are diffusely osteopenic. Prevertebral soft tissues appear within normal limits.     - X-ray b/l shoulders 3/20/24:  There is no radiographic evidence of acute osseous, articular, or soft tissue abnormality. There is mild-to-moderate bilateral AC joint arthrosis. Bilateral glenohumeral osteoarthritis also noted, moderate on the left and mild on the right. Periarticular calcific density seen adjacent to the proximal left humeral diaphysis may potentially represent an intra-articular body within the biceps tendon sheath. Postoperative findings are present in the visualized cervical spine. Visualized osseous structures appear diffusely osteopenic.     - MRI Cervical Spine 2/26/24:  Normal anatomic alignment.  Postop changes from C7-T1 ACDF.  Cervical spinal cord shows normal shape, volume and signal.  C1-C2:Anterior longitudinal interval degenerative arthritis and retrograde and ligamentous thickening. C1-C2 facet arthritis  C2-C3:Hypertrophic degenerative facet arthropathy much worse on the right side. 2 mm disc osteophyte  bulge. The ligament flavum thickening is effacing the dorsal subarachnoid space and contacting the cord, but there is adequate CSF dorsal to the cord and overall no cord compression. Severe right foraminal stenosis. Mild left foraminal stenosis.  C3-C4:2 mm disc osteophyte bulge and ligament flavum hypertrophy produce mild central canal stenosis without any cord compression. Bilateral hypertrophic degenerative facet arthropathy and uncovertebral joint osteophytes more evident on the right side produces moderate right and mild left foraminal stenosis.  C4-C5:Moderate desiccation and degenerative loss in height of the disc. Left-sided hypertrophic degenerative facet arthropathy and uncovertebral joint osteophyte produces severe left neural foraminal stenosis. No central canal stenosis.  C5-C6:Moderate desiccation and degenerative loss in height of the disc. A 3.5 mm disc osteophyte bulge is slightly asymmetric to the right side along with ligamentum flavum hypertrophy produces mild/moderate central canal stenosis but no cord compression. Very large right uncovertebral joint osteophyte produces severe right neural foraminal stenosis along with degenerative facet arthropathy. Small left uncovertebral joint osteophyte producing mild left foraminal stenosis.  C6-C7:Mild degenerative loss in height of the disc. 3 mm disc osteophyte bulge producing mild central canal stenosis without any cord compression. Bilateral uncovertebral joint osteophytes and mild hypertrophic degenerative facet arthropathy, worse on the right produces moderate right and mild left foraminal stenosis.  C7-T1:Central canal and neural foramina are well decompressed.  T1-T2: Mild degenerative facet arthropathy without any stenosis.      -MRI Lumbar Spine 12/22/21  Alignment: Levoscoliosis of 19 degrees is seen with the apex being at approximately the L2 level.  Vertebral bodies: Normal height and marrow signal. No transitional anatomy.  T12-L1: The disc  is normal. The facets are normal. The central canal and neural foramen are normally maintained. No displacement or compression of the neural elements are seen.  L1-L2: There is loss of disc space height with desiccation. A concentric bulge measuring approximately 3 mm present. Degenerative changes are present in the facets consisting mainly of hypertrophic changes. There is mild narrowing of the central canal. Mild narrowing of the neural foramen is present bilaterally at the disc level. No displacement or compression of the neural elements are seen.  L2-L3: Loss of disc space height with desiccation is present. There is an approximate 2-3 mm concentric disc bulge. Degenerative changes are present in the facets. Moderate narrowing of the central canal is present. There is mild narrowing of the left neural foramen at the disc level. Moderate narrowing of the right neural foramen at both the infrapedicular and disc level is seen. No displacement or compression of the neural elements are seen.  L3-L4: Loss of disc space height with desiccation is present. There is an approximate 4 to 5 mm eccentric disc bulge lateralizing to the patient's right side. Degenerative changes are present in the facets. There is moderate narrowing of the central canal. Moderate to severe narrowing of the right subarticular recess is seen. There is mild narrowing of the right left neural foramen at the disc level. The exiting nerve root is normally maintained. Moderate narrowing of the right neural foramen at both the infrapedicular and disc level is seen. The exiting nerve root on this side appears normally maintained as well.  L4-L5: Loss of disc space height with desiccation is present. Eccentric disc bulge lateralizing to the patient's left side is seen. This measures a maximum of approximately 4 to 5 mm. Degenerative changes are present in the facets consisting mainly of hypertrophic changes. There is mild to moderate narrowing of the  "central canal. Moderate narrowing of the neural foramen is seen on the left at both the  infrapedicular and disc level. The right neural foramen appears fairly well-maintained. No displacement or compression of the neural elements are seen.  L5-S1: Loss of disc space height with desiccation is seen. There is a disc bulge present measuring approximately 5-6 mm. Degenerative changes are present in the facets consisting mainly of hypertrophic changes. There is mild narrowing of the central canal. Moderate narrowing of the neural foramen is present bilaterally at both the infrapedicular and disc level. No displacement or compression of the neural elements are seen.  Spinal cord: The cord extends down to the L1 level. The cord has a normal size, configuration and signal pattern.  Additional findings: None seen.      Labs:  BMP  No results found for: "NA", "K", "CL", "CO2", "BUN", "CREATININE", "CALCIUM", "ANIONGAP", "EGFRNORACEVR"  No results found for: "ALT", "AST", "GGT", "ALKPHOS", "BILITOT"  No results found for: "PLT"    Assessment:  Alexsandra Garcia is a 69 y.o. female with the following diagnoses based on history, exam, and imaging:    Problem List Items Addressed This Visit    None  Visit Diagnoses       Chronic bilateral low back pain without sciatica    -  Primary    Relevant Medications    baclofen (LIORESAL) 5 mg Tab tablet    tiZANidine (ZANAFLEX) 4 MG tablet    Other Relevant Orders    MRI Lumbar Spine Without Contrast    Poor posture        Relevant Medications    baclofen (LIORESAL) 5 mg Tab tablet    tiZANidine (ZANAFLEX) 4 MG tablet    Chronic neck pain        Relevant Medications    baclofen (LIORESAL) 5 mg Tab tablet    tiZANidine (ZANAFLEX) 4 MG tablet    Insomnia secondary to chronic pain        Relevant Medications    baclofen (LIORESAL) 5 mg Tab tablet    tiZANidine (ZANAFLEX) 4 MG tablet    Cervical spondylosis        Relevant Medications    baclofen (LIORESAL) 5 mg Tab tablet    tiZANidine " (ZANAFLEX) 4 MG tablet    Cervical radiculopathy        Relevant Medications    baclofen (LIORESAL) 5 mg Tab tablet    tiZANidine (ZANAFLEX) 4 MG tablet    Chronic pain of both shoulders        Impingement syndrome of shoulder, left        Relevant Orders    Large Joint Aspiration/Injection: L subacromial bursa              This is a pleasant 69 y.o. lady presenting with:     - Chronic neck pain and bilateral shoulder/arm pains: Prior C7-T1 ACDF. There is severe foraminal stenosis on the right at C5-6 and on the left at C4-5 as well as diffuse facet arthropathy, appears most significant at C4-5 on the left and C5-6 on the right.   - Chronic bilateral shoulder pain: Multifactorial with contributions from C-spine as well. She has signs of shoulder impingement, AC joint arthropathy   - Chronic bilateral low back pain and some posterior thigh pain: I suspect her pain is primarily related to her facet joint arthropathy. There is pain localized over SIJ, but negative MORALES b/l.    - Pain primarily over left low back currently.   - Comorbidities: Osteoporosis. Recurrent UTI on daily abx. GERD. HTN. Atrial septal defect. Anxiety. Pulmonary stenosis.     Treatment Plan:   - PT/OT/HEP: Cont PT for neck and back pain. Discussed benefits of exercise for pain.   - Procedures: Performed left subacromial bursa CSI today in clinic.   - Consider left vs bilateral L4-5, L5-S1 diagnostic MBBs for back pain  - Can repeat C7-T1 IL JANETTE with local sedation PRN   - If limited relief, plan for bilateral C4-5, C5-6 diagnostic MBBs.  - Medications:    - Rx for baclofen 5 mg BID PRN  - Cont tizanidine 2-4 mg qHS to help with sleep and pain.   - Imaging: Reviewed. Order lumbar MRI as no improvement with PT  - Labs: None.    Follow Up: RTC after imaging or sooner PRN        Marlene Molina MD  Interventional Pain Medicine

## 2024-09-03 NOTE — PROCEDURES
Large Joint Aspiration/Injection: L subacromial bursa    Date/Time: 9/3/2024 1:40 PM    Performed by: Marlene Molina MD  Authorized by: Marlene Molina MD    Consent Done?:  Yes (Written)  Indications:  Pain  Site marked: the procedure site was marked    Timeout: prior to procedure the correct patient, procedure, and site was verified    Prep: patient was prepped and draped in usual sterile fashion      Details:  Needle Size:  25 G  Ultrasonic Guidance for needle placement?: No    Approach:  Lateral  Location:  Shoulder  Site:  L subacromial bursa  Medications:  40 mg methylPREDNISolone acetate 40 mg/mL; 2 mL LIDOcaine HCL 10 mg/ml (1%) 10 mg/mL (1 %)  Patient tolerance:  Patient tolerated the procedure well with no immediate complications

## 2024-09-04 ENCOUNTER — TELEPHONE (OUTPATIENT)
Dept: PAIN MEDICINE | Facility: CLINIC | Age: 69
End: 2024-09-04
Payer: MEDICARE

## 2024-09-04 NOTE — TELEPHONE ENCOUNTER
----- Message from Shahrzad Young sent at 9/4/2024 12:23 PM CDT -----  Contact: Alexsandra Upton called in to give date and time of her appointments 09/18/24 at 4:45pm for her MRI. Please if her at [phone] if any questions.    Thanks  sl

## 2024-09-20 ENCOUNTER — TELEPHONE (OUTPATIENT)
Dept: UROLOGY | Facility: CLINIC | Age: 69
End: 2024-09-20
Payer: MEDICARE

## 2024-09-20 DIAGNOSIS — N28.89 RENAL MASS: Primary | ICD-10-CM

## 2024-09-20 NOTE — TELEPHONE ENCOUNTER
Attempted to contact pt regarding scheduling an appointment. Pt did not answer, left detailed message to return call.

## 2024-09-20 NOTE — TELEPHONE ENCOUNTER
Spoke with pt regarding scheduling appointment for renal mass. Pt will call me once she has scheduled her CT and I will schedule her the following day so that Dr. Wood has CT results at appointment.    ----- Message from Carly Martin sent at 9/20/2024  2:37 PM CDT -----  Contact: Alexsandra  Type:  Patient Returning Call    Who Called:Alexsandra  Who Left Message for Patient:unknown  Does the patient know what this is regarding?:Results  Would the patient rather a call back or a response via [facility]? call  Best Call Back Number:[phone]   Additional Information: Patient reports missing a call and request to receive another call back.  Thank you,  GH

## 2024-09-24 ENCOUNTER — TELEPHONE (OUTPATIENT)
Dept: UROLOGY | Facility: CLINIC | Age: 69
End: 2024-09-24
Payer: MEDICARE

## 2024-09-24 ENCOUNTER — PATIENT MESSAGE (OUTPATIENT)
Dept: FAMILY MEDICINE | Facility: CLINIC | Age: 69
End: 2024-09-24
Payer: MEDICARE

## 2024-09-24 NOTE — TELEPHONE ENCOUNTER
Pt having CT done 9/30. I have scheduled her to see Dr. Wood 10/1.    ----- Message from Tessa Garcia sent at 9/24/2024  8:42 AM CDT -----  Regarding: Call Back  Contact: patient  Type:  Patient  Call Back     Who Called:Alexsandra   Who Left Message for Patient:patient   Does the patient know what this is regarding?:date for CT Scan   Would the patient rather a call back or a response via [facility]?  Call back   Best Call Back Number: [phone] (home)     Additional Information: The appointment for CT Scan is for 09/30/2024 at [facility],  [place]

## 2024-09-26 ENCOUNTER — TELEPHONE (OUTPATIENT)
Dept: PAIN MEDICINE | Facility: CLINIC | Age: 69
End: 2024-09-26
Payer: MEDICARE

## 2024-09-26 NOTE — TELEPHONE ENCOUNTER
----- Message from Marlene Molina MD sent at 9/20/2024 12:31 PM CDT -----  Imaging reviewed.  There is a right renal mass. I am going to order CT abd to assess and recommend she schedule follow-up with Anne Gomes NP in case she needs further assessment or treatment of the potential right renal mass.     In regards to the lumbar spine, there is modic 1 changes at L5-S1 and modic 2 changes at L3-4 and L4-5 and diffuse facet arthropathy and annular tears at L4-5 and L5-S1.     Please schedule patient follow-up with Anne Gomes NP.    Please fax order for CT Abd and schedule f/u with me or CJ.     Please pull images from [facility] into Epic.

## 2024-09-27 NOTE — TELEPHONE ENCOUNTER
Spoke with patient, she reports that she is scheduled for CT on Monday 9/30 and is scheduled for 10/1 to discuss imaging with Dr Wood. Pt would like to know if Dr Molina wanted to schedule an injection now, or wait until after cleared by Urology?

## 2024-10-01 ENCOUNTER — OFFICE VISIT (OUTPATIENT)
Dept: UROLOGY | Facility: CLINIC | Age: 69
End: 2024-10-01
Payer: MEDICARE

## 2024-10-01 VITALS — HEART RATE: 84 BPM | SYSTOLIC BLOOD PRESSURE: 132 MMHG | DIASTOLIC BLOOD PRESSURE: 66 MMHG

## 2024-10-01 DIAGNOSIS — N28.89 RENAL MASS: Primary | ICD-10-CM

## 2024-10-01 PROCEDURE — 99214 OFFICE O/P EST MOD 30 MIN: CPT | Mod: S$GLB,,, | Performed by: UROLOGY

## 2024-10-01 NOTE — PROGRESS NOTES
Subjective:       Patient ID: Alexsandra Garcia is a 69 y.o. female.    Chief Complaint: No chief complaint on file.      HPI:  69-year-old female who was seen by Dr. Marlene doty for nerve related issues had an MRI which suggested possible renal mass he ordered a CT scan without contrast this was indeterminate.  Patient is here in follow-up    Past Medical History:   Past Medical History:   Diagnosis Date    Anxiety     Arthritis     GERD (gastroesophageal reflux disease)     Pulmonary stenosis     Urinary tract infection        Past Surgical Historical:   Past Surgical History:   Procedure Laterality Date    ELBOW SURGERY Right 1980    FOOT SURGERY Left 2019    HIP SURGERY Right 2020    NECK SURGERY  1979    NECK SURGERY  1983        Medications:   Medication List with Changes/Refills   Current Medications    BACLOFEN (LIORESAL) 5 MG TAB TABLET    Take 1 tablet (5 mg total) by mouth 2 (two) times daily as needed (back pain).    CHOLECALCIFEROL, VITAMIN D3, (VITAMIN D3) 25 MCG (1,000 UNIT) CAPSULE    Take 1,000 Units by mouth once daily.    DULOXETINE (CYMBALTA) 60 MG CAPSULE    Take 60 mg by mouth once daily.    FAMOTIDINE (PEPCID) 40 MG TABLET        FLUTICASONE PROPIONATE (FLONASE) 50 MCG/ACTUATION NASAL SPRAY    by Each Nostril route.    HYDROCODONE-ACETAMINOPHEN (NORCO)  MG PER TABLET    hydrocodone 10 mg-acetaminophen 325 mg tablet    HYDROXYZINE HCL (ATARAX) 25 MG TABLET    Take 25 mg by mouth 3 (three) times daily.    MULTIVITAMIN (THERAGRAN) TABLET    Take 1 tablet by mouth once daily.    NITROFURANTOIN, MACROCRYSTAL-MONOHYDRATE, (MACROBID) 100 MG CAPSULE        PANTOPRAZOLE (PROTONIX) 40 MG TABLET    Take 40 mg by mouth.    ROSUVASTATIN (CRESTOR) 20 MG TABLET    Take 20 mg by mouth once daily.    SPIRONOLACTONE (ALDACTONE) 25 MG TABLET    Take 25 mg by mouth once daily.    TIZANIDINE (ZANAFLEX) 4 MG TABLET    Take 0.5-1 tablets (2-4 mg total) by mouth nightly as needed (spasm, pain).        Past  Social History:   Social History     Socioeconomic History    Marital status:    Tobacco Use    Smoking status: Never    Smokeless tobacco: Never   Substance and Sexual Activity    Alcohol use: Yes     Comment: a couple of beers on a friday    Drug use: Yes     Types: Marijuana     Comment: prescibed marijuana       Allergies:   Review of patient's allergies indicates:   Allergen Reactions    Pcn [penicillins] Rash        Family History:   Family History   Problem Relation Name Age of Onset    Cancer Father      Bladder Cancer Father      Cancer Mother      Breast cancer Mother          Review of Systems:  Review of Systems   Constitutional:  Negative for activity change and appetite change.   HENT:  Negative for congestion and dental problem.    Respiratory:  Negative for chest tightness and shortness of breath.    Cardiovascular:  Negative for chest pain.   Gastrointestinal:  Negative for abdominal distention and abdominal pain.   Genitourinary:  Negative for decreased urine volume, difficulty urinating, dyspareunia, dysuria, enuresis, flank pain, frequency, genital sores, hematuria, pelvic pain and urgency.   Musculoskeletal:  Negative for back pain and neck pain.   Allergic/Immunologic: Negative for immunocompromised state.   Neurological:  Negative for dizziness.   Hematological:  Negative for adenopathy.   Psychiatric/Behavioral:  Negative for agitation, behavioral problems and confusion.        Physical Exam:  Physical Exam  Constitutional:       Appearance: She is well-developed.   HENT:      Head: Normocephalic and atraumatic.      Right Ear: External ear normal.      Left Ear: External ear normal.   Eyes:      Conjunctiva/sclera: Conjunctivae normal.   Neck:      Vascular: No JVD.   Cardiovascular:      Rate and Rhythm: Normal rate and regular rhythm.   Pulmonary:      Effort: Pulmonary effort is normal. No respiratory distress.      Breath sounds: Normal breath sounds. No wheezing.   Abdominal:       General: There is no distension.      Palpations: Abdomen is soft.      Tenderness: There is no abdominal tenderness. There is no rebound.   Musculoskeletal:         General: Normal range of motion.      Cervical back: Normal range of motion.   Skin:     General: Skin is warm and dry.      Findings: No erythema or rash.   Neurological:      Mental Status: She is alert and oriented to person, place, and time.   Psychiatric:         Behavior: Behavior normal.         Assessment/Plan:       Problem List Items Addressed This Visit    None  Visit Diagnoses       Renal mass    -  Primary    Relevant Orders    US Retroperitoneal Complete               69-year-old female with possible renal lesion CT was negative but it was a CT noncontrast we will order ultrasound to further evaluate the possibility of a renal lesion

## 2024-10-02 ENCOUNTER — OFFICE VISIT (OUTPATIENT)
Dept: PAIN MEDICINE | Facility: CLINIC | Age: 69
End: 2024-10-02
Payer: MEDICARE

## 2024-10-02 VITALS
BODY MASS INDEX: 19.29 KG/M2 | HEIGHT: 66 IN | DIASTOLIC BLOOD PRESSURE: 55 MMHG | WEIGHT: 120 LBS | HEART RATE: 76 BPM | SYSTOLIC BLOOD PRESSURE: 104 MMHG | OXYGEN SATURATION: 96 %

## 2024-10-02 DIAGNOSIS — M54.2 CHRONIC NECK PAIN: ICD-10-CM

## 2024-10-02 DIAGNOSIS — M47.816 LUMBAR SPONDYLOSIS: ICD-10-CM

## 2024-10-02 DIAGNOSIS — M54.50 CHRONIC BILATERAL LOW BACK PAIN WITHOUT SCIATICA: Primary | ICD-10-CM

## 2024-10-02 DIAGNOSIS — G89.29 CHRONIC BILATERAL LOW BACK PAIN WITHOUT SCIATICA: Primary | ICD-10-CM

## 2024-10-02 DIAGNOSIS — G89.29 CHRONIC NECK PAIN: ICD-10-CM

## 2024-10-02 DIAGNOSIS — M47.816 LUMBAR SPONDYLOSIS: Primary | ICD-10-CM

## 2024-10-02 PROCEDURE — 99214 OFFICE O/P EST MOD 30 MIN: CPT | Mod: S$PBB,,, | Performed by: PHYSICAL MEDICINE & REHABILITATION

## 2024-10-02 NOTE — PROGRESS NOTES
[facility]      Chief Complaint:   Chief Complaint   Patient presents with    Low-back Pain       History of Present Illness: Alexsandra Garcia is a 69 y.o. female referred by Eloisa Yusuf for neck pain.      Onset: 1 year. She had prior C7-T1 anterior fusion   Location: neck  Radiation: back and bilateral shoulders  Timing: intermittent  Quality: Aching, Deep, and Stabbing  Exacerbating Factors: general activity  Alleviating Factors: massage and exercise/therapy  Associated Symptoms: She feels weakness in her arms and legs and numbness in both fingertips. She denies night fever/night sweats, urinary incontinence/change in function, bowel incontinence/change in function, and unexplained weight loss    Functional Limitations: Pain is limiting her driving, sleeping, getting dressed.    Patient has failed > 6 weeks of conservative treatment including: physical therapy and oral medications. She has been in PT for about a year.     Severity: Currently: 4/10   Typical Range: 4-10/10     Exacerbation: 10/10     P = 8  E = 6  G = 6  Baseline PEG Score = 6.67      Interval History (10/02/2024):  Alexsandra Garcia returns today for follow up.  At the last clinic visit, performed left subacromial bursa CSI. Cont PT     Currently, the low back pain is stable.  Pain in bilateral low back.    She cont in PT.     She has been more worried about her kidney mass, but nothing seen on CT or ultrasound.       Current Pain Scales:  Current: 4/10              Typical Range: 4-8/10     Current PEG Score: 8.67    Opioid Risk Score         Value Time User    Opioid Risk Score  1 3/20/2024  1:21 PM Ketty Ceballos LPN             Previous Interventions:  - 4/29/24: C7-T1 IL JANETTE with 90% relief    Previous Therapies:  PT/OT: yes   Relevant Surgery: yes    - C7-T1 anterior fusion    - Right hip replacement  Previous Medications:   - NSAIDS: tylenol.  - Muscle Relaxants: tizanidine   - TCAs: Elavil   - SNRIs: duloxetine   -  Topicals:   - Anticonvulsants:    - Opioids: Hydrocodone    Current Pain Medications:  Duloxetine 60 mg  Norco  mg  Zanaflex 2 mg BID  Elavil 25 mg     Blood Thinners: none    Full Medication List:    Current Outpatient Medications:     cholecalciferol, vitamin D3, (VITAMIN D3) 25 mcg (1,000 unit) capsule, Take 1,000 Units by mouth once daily., Disp: , Rfl:     DULoxetine (CYMBALTA) 60 MG capsule, Take 60 mg by mouth once daily., Disp: , Rfl:     famotidine (PEPCID) 40 MG tablet, , Disp: , Rfl:     fluticasone propionate (FLONASE) 50 mcg/actuation nasal spray, by Each Nostril route., Disp: , Rfl:     HYDROcodone-acetaminophen (NORCO)  mg per tablet, hydrocodone 10 mg-acetaminophen 325 mg tablet, Disp: , Rfl:     hydrOXYzine HCL (ATARAX) 25 MG tablet, Take 25 mg by mouth 3 (three) times daily., Disp: , Rfl:     multivitamin (THERAGRAN) tablet, Take 1 tablet by mouth once daily., Disp: , Rfl:     nitrofurantoin, macrocrystal-monohydrate, (MACROBID) 100 MG capsule, , Disp: , Rfl:     pantoprazole (PROTONIX) 40 MG tablet, Take 40 mg by mouth., Disp: , Rfl:     rosuvastatin (CRESTOR) 20 MG tablet, Take 20 mg by mouth once daily., Disp: , Rfl:     spironolactone (ALDACTONE) 25 MG tablet, Take 25 mg by mouth once daily., Disp: , Rfl:     tiZANidine (ZANAFLEX) 4 MG tablet, Take 0.5-1 tablets (2-4 mg total) by mouth nightly as needed (spasm, pain)., Disp: 60 tablet, Rfl: 2    baclofen (LIORESAL) 5 mg Tab tablet, Take 1 tablet (5 mg total) by mouth 2 (two) times daily as needed (back pain). (Patient not taking: Reported on 10/2/2024), Disp: 60 tablet, Rfl: 2     Review of Systems: See HPI    Allergies:  Pcn [penicillins]     Medical History:   has a past medical history of Anxiety, Arthritis, GERD (gastroesophageal reflux disease), Pulmonary stenosis, and Urinary tract infection.    Surgical History:   has a past surgical history that includes Hip surgery (Right, 2020); Neck surgery (1979); Neck surgery (1983);  "Foot surgery (Left, 2019); and Elbow surgery (Right, 1980).    Family History:  family history includes Bladder Cancer in her father; Breast cancer in her mother; Cancer in her father and mother.    Social History:   reports that she has never smoked. She has never used smokeless tobacco. She reports current alcohol use. She reports current drug use. Drug: Marijuana.    Physical Exam:  BP (!) 104/55 (BP Location: Left arm, Patient Position: Sitting)   Pulse 76   Ht 5' 6" (1.676 m)   Wt 54.4 kg (120 lb)   SpO2 96%   BMI 19.37 kg/m²   GEN: No acute distress. Calm, comfortable  HENT: Normocephalic, atraumatic, moist mucous membranes  EYE: Anicteric sclera, non-injected.   CV: Non-diaphoretic. Regular Rate. Radial Pulses 2+.  RESP: Breathing comfortably. Chest expansion symmetric.  EXT: No clubbing, cyanosis.   SKIN: Warm, & dry to palpation. No visible rashes or lesions of exposed skin.   PSYCH: Pleasant mood and appropriate affect. Recent and remote memory intact.   GAIT: Independent, normal ambulation  Neck Exam:       Inspection: No erythema, bruising. Forward head and rounded shoulders      Palpation: (+) TTP of b/l cervical paraspinals, trapezius, rhomboids, levators, supraspinatus      ROM:  Limitation in all planes      Provocative Maneuvers:  (-) Spurling's bilaterally  Shoulder Exam:       Inspection: No erythema, bruising.       Palpation: (+) TTP of b/l subacromial space, b/l AC joints, b/l proximal biceps tendons.       ROM:  Limited in abduction, internal rotation b/l      Provocative Maneuvers:  (+) Hawkin's b/l       (+) Empty Can b/l  Lumbar Spine Exam:       Inspection: No erythema, bruising.       Palpation: (+) TTP of lumbar paraspinals and SIJ b/l      ROM:  Limited in flexion, extension, lateral bending.    Pain w/ flex > ext      (+) Facet loading bilaterally      (-) Straight Leg Raise bilaterally      (-) MORALES bilaterally  (+) Gaenslan's Test bilaterally  (+) Thigh thrust/Posterior Pelvic " Pain Provocation test on right  (+) Sacral thrust w/ pain bilaterally   (+) Yeoman's test bilaterally  Hip Exam:      Inspection: No gross deformity or apparent leg length discrepancy      Palpation:  No TTP to bilateral greater trochanteric bursas, piriformis.       ROM:  No limitation or pain in internal rotation, external rotation b/l  Neurologic Exam:     Alert. Speech is fluent and appropriate.     Strength: 4/5 in b/l hip flexion, otherwise 5/5 throughout bilateral upper & lower extremities     Sensation:  Grossly intact to light touch in bilateral upper & lower extremities     Reflexes: 1+ in b/l patella, achilles, biceps, brachioradialis, triceps     Tone: No abnormality appreciated in bilateral upper or lower extremities     (-) Jones bilaterally     No Clonus      Imaging:  -MRI Lumbar Spine 9/18/24        - X-ray lumbar spine complete w/ flex/ext 5/16/24:  There is mild levoscoliosis of the lumbar spine. Vertebral body heights are within normal limits. There is slight retrolisthesis of L2 on L3 and L3 on L4. There is moderate disc height loss from L2-3 through L5-S1. Mild disc height loss at L1-2. Multilevel facet arthropathy suspected. No fractures visualized. Visualized osseous structures are diffusely osteopenic. Prior right hip arthroplasty noted.     - X-ray lumbar spine 3/20/24:  There is mild levoscoliosis of the lumbar spine. Vertebral body heights are within normal limits. There is slight retrolisthesis of L2 on L3 and L3 on L4. There is moderate disc height loss from L2-3 through L5-S1. Mild disc height loss at L1-2. Multilevel facet arthropathy suspected. No fractures visualized. Visualized osseous structures are diffusely osteopenic. Prior right hip arthroplasty noted.     - X-ray cervical spine 3/20/24:  The patient is status post ACDF of C7-T1. Cervical lordosis appears somewhat accentuated. Vertebral body heights are within normal limits. There is slight retrolisthesis of C5 on C6.  Moderate to severe disc height loss at C5-6. Mild-to-moderate disc height loss at C6-7. Probable mild disc height loss at C2-3 and C4-5. Multilevel bilateral facet arthropathy suspected. No fractures demonstrated. Odontoid process appears intact. Atlantoaxial articulations appear normal. Visualized osseous structures are diffusely osteopenic. Prevertebral soft tissues appear within normal limits.     - X-ray b/l shoulders 3/20/24:  There is no radiographic evidence of acute osseous, articular, or soft tissue abnormality. There is mild-to-moderate bilateral AC joint arthrosis. Bilateral glenohumeral osteoarthritis also noted, moderate on the left and mild on the right. Periarticular calcific density seen adjacent to the proximal left humeral diaphysis may potentially represent an intra-articular body within the biceps tendon sheath. Postoperative findings are present in the visualized cervical spine. Visualized osseous structures appear diffusely osteopenic.     - MRI Cervical Spine 2/26/24:  Normal anatomic alignment.  Postop changes from C7-T1 ACDF.  Cervical spinal cord shows normal shape, volume and signal.  C1-C2:Anterior longitudinal interval degenerative arthritis and retrograde and ligamentous thickening. C1-C2 facet arthritis  C2-C3:Hypertrophic degenerative facet arthropathy much worse on the right side. 2 mm disc osteophyte bulge. The ligament flavum thickening is effacing the dorsal subarachnoid space and contacting the cord, but there is adequate CSF dorsal to the cord and overall no cord compression. Severe right foraminal stenosis. Mild left foraminal stenosis.  C3-C4:2 mm disc osteophyte bulge and ligament flavum hypertrophy produce mild central canal stenosis without any cord compression. Bilateral hypertrophic degenerative facet arthropathy and uncovertebral joint osteophytes more evident on the right side produces moderate right and mild left foraminal stenosis.  C4-C5:Moderate desiccation and  degenerative loss in height of the disc. Left-sided hypertrophic degenerative facet arthropathy and uncovertebral joint osteophyte produces severe left neural foraminal stenosis. No central canal stenosis.  C5-C6:Moderate desiccation and degenerative loss in height of the disc. A 3.5 mm disc osteophyte bulge is slightly asymmetric to the right side along with ligamentum flavum hypertrophy produces mild/moderate central canal stenosis but no cord compression. Very large right uncovertebral joint osteophyte produces severe right neural foraminal stenosis along with degenerative facet arthropathy. Small left uncovertebral joint osteophyte producing mild left foraminal stenosis.  C6-C7:Mild degenerative loss in height of the disc. 3 mm disc osteophyte bulge producing mild central canal stenosis without any cord compression. Bilateral uncovertebral joint osteophytes and mild hypertrophic degenerative facet arthropathy, worse on the right produces moderate right and mild left foraminal stenosis.  C7-T1:Central canal and neural foramina are well decompressed.  T1-T2: Mild degenerative facet arthropathy without any stenosis.      -MRI Lumbar Spine 12/22/21  Alignment: Levoscoliosis of 19 degrees is seen with the apex being at approximately the L2 level.  Vertebral bodies: Normal height and marrow signal. No transitional anatomy.  T12-L1: The disc is normal. The facets are normal. The central canal and neural foramen are normally maintained. No displacement or compression of the neural elements are seen.  L1-L2: There is loss of disc space height with desiccation. A concentric bulge measuring approximately 3 mm present. Degenerative changes are present in the facets consisting mainly of hypertrophic changes. There is mild narrowing of the central canal. Mild narrowing of the neural foramen is present bilaterally at the disc level. No displacement or compression of the neural elements are seen.  L2-L3: Loss of disc space  height with desiccation is present. There is an approximate 2-3 mm concentric disc bulge. Degenerative changes are present in the facets. Moderate narrowing of the central canal is present. There is mild narrowing of the left neural foramen at the disc level. Moderate narrowing of the right neural foramen at both the infrapedicular and disc level is seen. No displacement or compression of the neural elements are seen.  L3-L4: Loss of disc space height with desiccation is present. There is an approximate 4 to 5 mm eccentric disc bulge lateralizing to the patient's right side. Degenerative changes are present in the facets. There is moderate narrowing of the central canal. Moderate to severe narrowing of the right subarticular recess is seen. There is mild narrowing of the right left neural foramen at the disc level. The exiting nerve root is normally maintained. Moderate narrowing of the right neural foramen at both the infrapedicular and disc level is seen. The exiting nerve root on this side appears normally maintained as well.  L4-L5: Loss of disc space height with desiccation is present. Eccentric disc bulge lateralizing to the patient's left side is seen. This measures a maximum of approximately 4 to 5 mm. Degenerative changes are present in the facets consisting mainly of hypertrophic changes. There is mild to moderate narrowing of the central canal. Moderate narrowing of the neural foramen is seen on the left at both the  infrapedicular and disc level. The right neural foramen appears fairly well-maintained. No displacement or compression of the neural elements are seen.  L5-S1: Loss of disc space height with desiccation is seen. There is a disc bulge present measuring approximately 5-6 mm. Degenerative changes are present in the facets consisting mainly of hypertrophic changes. There is mild narrowing of the central canal. Moderate narrowing of the neural foramen is present bilaterally at both the  "infrapedicular and disc level. No displacement or compression of the neural elements are seen.  Spinal cord: The cord extends down to the L1 level. The cord has a normal size, configuration and signal pattern.  Additional findings: None seen.      Labs:  BMP  No results found for: "NA", "K", "CL", "CO2", "BUN", "CREATININE", "CALCIUM", "ANIONGAP", "EGFRNORACEVR"  No results found for: "ALT", "AST", "GGT", "ALKPHOS", "BILITOT"  No results found for: "PLT"    Assessment:  Alexsandra Garcia is a 69 y.o. female with the following diagnoses based on history, exam, and imaging:    Problem List Items Addressed This Visit    None  Visit Diagnoses       Chronic bilateral low back pain without sciatica    -  Primary    Lumbar spondylosis        Chronic neck pain                    This is a pleasant 69 y.o. lady presenting with:     - Chronic neck pain and bilateral shoulder/arm pains: Prior C7-T1 ACDF. There is severe foraminal stenosis on the right at C5-6 and on the left at C4-5 as well as diffuse facet arthropathy, appears most significant at C4-5 on the left and C5-6 on the right.   - Chronic bilateral shoulder pain: Multifactorial with contributions from C-spine as well. She has signs of shoulder impingement, AC joint arthropathy   - Chronic bilateral low back pain and some posterior thigh pain:   - I suspect her pain is primarily related to her facet joint arthropathy. There is pain localized over SIJ, but negative MORALES b/l. Potentially disc vs endplate pain     - Patient with moderate to severe chronic low back pain that is predominantly axial with no radicular component or neurogenic claudication.. The pain is severe enough to greatly impact quality of life &/or function as evidenced on the patients PEG score and NRS.    - Pain persists for greater than 3 months despite conservative treatment, including: Activity modification (avoiding exacerbating factors), physical therapy, physician-led home exercise program, " and oral medications.    - Facet joints currently suspected as primary pain generator based on clinical assessment & radiology studies, as there is no fracture, tumor, infection, and significant deformity. There is NO surgical fusion (such as Anterior Lumbar Interbody Fusion) at the spinal segment to be targeted..   - This facet level has not undergone ablation in the previous 2 years.   - Comorbidities: Osteoporosis. Recurrent UTI on daily abx. GERD. HTN. Atrial septal defect. Anxiety. Pulmonary stenosis.     Treatment Plan:   - PT/OT/HEP: Cont PT for neck and back pain. Discussed benefits of exercise for pain.   - Procedures: Schedule diagnostic medial branch block of bilateral L4/5 & L5/S1 facet joints under fluoroscopic guidance with local sedation. (CPT Codes 20992, 00601, KX modifier). If MBB successful x 2, plan for RFA. The patient is not allergic to iodinated contrast. Patient is not currently taking blood thinners for cardiovascular prophylaxis  - If limited relief, consider bl SIJ  - Can repeat C7-T1 IL JANETTE with local sedation PRN   - If limited relief, plan for bilateral C4-5, C5-6 diagnostic MBBs.  - Medications:    - Rx for baclofen 5 mg BID PRN  - Cont tizanidine 2-4 mg qHS to help with sleep and pain.   - Imaging: Reviewed.   - Labs: None.      Follow Up: RTC after MBB or sooner PRN        Marlene Molina MD  Interventional Pain Medicine

## 2024-10-03 ENCOUNTER — TELEPHONE (OUTPATIENT)
Dept: UROLOGY | Facility: CLINIC | Age: 69
End: 2024-10-03
Payer: MEDICARE

## 2024-10-03 DIAGNOSIS — N28.89 RENAL MASS: Primary | ICD-10-CM

## 2024-10-03 NOTE — TELEPHONE ENCOUNTER
Attempted to contact pt regarding U/S results. Renal us order placed for 6 months out.    ----- Message from Dino Wood MD sent at 10/3/2024 10:46 AM CDT -----  Please call the patient and inform her that on ultrasound there was no sign of any mass we will repeat an ultrasound in 6 months please order an ultrasound in 6 months

## 2024-10-03 NOTE — TELEPHONE ENCOUNTER
Spoke with pt and informed of rsults.    ----- Message from Pat sent at 10/3/2024 10:58 AM CDT -----  Contact: self  Type:  Patient Returning Call    Who Called:Alexsandra Garcia  Who Left Message for Patient:Katiana  Does the patient know what this is regarding?:results  Would the patient rather a call back or a response via [facility]? Call back  Best Call Back Number:[phone]  Additional Information: n/a

## 2024-10-22 ENCOUNTER — TELEPHONE (OUTPATIENT)
Dept: PAIN MEDICINE | Facility: CLINIC | Age: 69
End: 2024-10-22
Payer: MEDICARE

## 2024-10-22 NOTE — TELEPHONE ENCOUNTER
----- Message from Taylor sent at 10/8/2024  8:36 AM CDT -----  Regarding: PA  Approved Auth# [id number] (10/28)

## 2024-10-31 ENCOUNTER — TELEPHONE (OUTPATIENT)
Dept: PAIN MEDICINE | Facility: CLINIC | Age: 69
End: 2024-10-31
Payer: MEDICARE

## 2025-02-11 ENCOUNTER — TELEPHONE (OUTPATIENT)
Dept: PAIN MEDICINE | Facility: CLINIC | Age: 70
End: 2025-02-11
Payer: MEDICARE

## 2025-02-11 NOTE — TELEPHONE ENCOUNTER
----- Message from Danya sent at 2/11/2025  1:03 PM CST -----  Contact: pt  Pt calling for appt for injection for neck and back and she can be reached at [phone].  Pt stated she is off the blood thinners.      Thanks,

## 2025-03-12 ENCOUNTER — OFFICE VISIT (OUTPATIENT)
Dept: PAIN MEDICINE | Facility: CLINIC | Age: 70
End: 2025-03-12
Payer: MEDICARE

## 2025-03-12 VITALS
WEIGHT: 129 LBS | HEART RATE: 78 BPM | OXYGEN SATURATION: 98 % | DIASTOLIC BLOOD PRESSURE: 67 MMHG | HEIGHT: 66 IN | SYSTOLIC BLOOD PRESSURE: 111 MMHG | BODY MASS INDEX: 20.73 KG/M2

## 2025-03-12 DIAGNOSIS — M54.50 CHRONIC BILATERAL LOW BACK PAIN WITHOUT SCIATICA: Primary | ICD-10-CM

## 2025-03-12 DIAGNOSIS — M70.62 TROCHANTERIC BURSITIS OF BOTH HIPS: ICD-10-CM

## 2025-03-12 DIAGNOSIS — M76.31 ILIOTIBIAL BAND SYNDROME, RIGHT: ICD-10-CM

## 2025-03-12 DIAGNOSIS — M47.816 LUMBAR SPONDYLOSIS: ICD-10-CM

## 2025-03-12 DIAGNOSIS — G89.29 CHRONIC BILATERAL LOW BACK PAIN WITHOUT SCIATICA: Primary | ICD-10-CM

## 2025-03-12 DIAGNOSIS — M70.61 TROCHANTERIC BURSITIS OF BOTH HIPS: ICD-10-CM

## 2025-03-12 DIAGNOSIS — M54.51 VERTEBROGENIC LOW BACK PAIN: ICD-10-CM

## 2025-03-12 PROCEDURE — 99214 OFFICE O/P EST MOD 30 MIN: CPT | Mod: S$PBB,,, | Performed by: PHYSICAL MEDICINE & REHABILITATION

## 2025-03-12 RX ORDER — OFLOXACIN 3 MG/ML
SOLUTION/ DROPS OPHTHALMIC
COMMUNITY
Start: 2025-01-28

## 2025-03-12 RX ORDER — METOPROLOL SUCCINATE 25 MG/1
12.5 TABLET, EXTENDED RELEASE ORAL DAILY
COMMUNITY
Start: 2025-02-07 | End: 2026-02-07

## 2025-03-12 RX ORDER — AZITHROMYCIN 250 MG/1
TABLET, FILM COATED ORAL
COMMUNITY
Start: 2024-10-30

## 2025-03-12 RX ORDER — FLUTICASONE PROPIONATE 50 MCG
100 SPRAY, SUSPENSION (ML) NASAL
COMMUNITY

## 2025-03-12 RX ORDER — PREDNISONE 20 MG/1
TABLET ORAL
COMMUNITY
Start: 2024-10-30

## 2025-03-12 NOTE — PROGRESS NOTES
[facility]      Chief Complaint:   Chief Complaint   Patient presents with    Low-back Pain         History of Present Illness: Alexsandra Garcia is a 69 y.o. female referred by Eloisa Yusuf for neck pain.      Neck pain:  Onset: 1 year. She had prior C7-T1 anterior fusion   Location: neck  Radiation: back and bilateral shoulders  Timing: intermittent  Quality: Aching, Deep, and Stabbing  Exacerbating Factors: general activity  Alleviating Factors: massage and exercise/therapy  Associated Symptoms: She feels weakness in her arms and legs and numbness in both fingertips. She denies night fever/night sweats, urinary incontinence/change in function, bowel incontinence/change in function, and unexplained weight loss    Functional Limitations: Pain is limiting her driving, sleeping, getting dressed.    Patient has failed > 6 weeks of conservative treatment including: physical therapy and oral medications. She has been in PT for about a year.     Severity: Currently: 4/10   Typical Range: 4-10/10     Exacerbation: 10/10     P = 8  E = 6  G = 6  Baseline PEG Score = 6.67      Opioid Risk Score         Value Time User    Opioid Risk Score  1 3/20/2024  1:21 PM Ketty Ceballos LPN               Previous Interventions:  - 4/29/24: C7-T1 IL JANETTE with 90% relief      Previous Therapies:  PT/OT: yes   Relevant Surgery: yes    - C7-T1 anterior fusion    - Right hip replacement  Previous Medications:   - NSAIDS: tylenol.  - Muscle Relaxants: tizanidine   - TCAs: Elavil   - SNRIs: duloxetine   - Topicals:   - Anticonvulsants:    - Opioids: Hydrocodone       Interval History (09/03/2024):   Back pain in bilateral low back and does not radiate into legs. Pain worse with flexion more than with extension. No new weakness or numbness. No changes in b/b function.      Interval History (03/12/2025):  Alexsandra Garcia returns today for follow up.  At the last clinic visit, referred to physical therapy and scheduled MBBs.      MBB note done due to her having a cardiac procedure.     Physical therapy provided 50% relief. She had heart procedure and now off of blood thinners.    Currently, the back pain is worse. She also is having some right knee pain and had MRI ordered of the right knee with her rheumatologist. SHe is also having right lateral hip pain.      Current Pain Scales:  Current: 6/10              Average: 6/10  Least-Worst: 4-8/10    Current PEG Score: 6    Current Pain Medications:  Duloxetine 60 mg  Norco  mg  Zanaflex 2 mg BID  Elavil 25 mg     Blood Thinners: none    Full Medication List:    Current Outpatient Medications:     cholecalciferol, vitamin D3, (VITAMIN D3) 25 mcg (1,000 unit) capsule, Take 1,000 Units by mouth once daily., Disp: , Rfl:     DULoxetine (CYMBALTA) 60 MG capsule, Take 60 mg by mouth once daily., Disp: , Rfl:     famotidine (PEPCID) 40 MG tablet, , Disp: , Rfl:     fluticasone propionate (FLONASE) 50 mcg/actuation nasal spray, by Each Nostril route., Disp: , Rfl:     fluticasone propionate (FLONASE) 50 mcg/actuation nasal spray, 100 mcg by Nasal route., Disp: , Rfl:     HYDROcodone-acetaminophen (NORCO)  mg per tablet, hydrocodone 10 mg-acetaminophen 325 mg tablet, Disp: , Rfl:     hydrOXYzine HCL (ATARAX) 25 MG tablet, Take 25 mg by mouth 3 (three) times daily., Disp: , Rfl:     metoprolol succinate (TOPROL-XL) 25 MG 24 hr tablet, Take 12.5 mg by mouth once daily., Disp: , Rfl:     multivitamin (THERAGRAN) tablet, Take 1 tablet by mouth once daily., Disp: , Rfl:     nitrofurantoin, macrocrystal-monohydrate, (MACROBID) 100 MG capsule, Take 100 mg by mouth once daily., Disp: , Rfl:     pantoprazole (PROTONIX) 40 MG tablet, Take 40 mg by mouth., Disp: , Rfl:     rosuvastatin (CRESTOR) 20 MG tablet, Take 20 mg by mouth once daily., Disp: , Rfl:     spironolactone (ALDACTONE) 25 MG tablet, Take 25 mg by mouth once daily., Disp: , Rfl:     tiZANidine (ZANAFLEX) 4 MG tablet, Take 0.5-1  "tablets (2-4 mg total) by mouth nightly as needed (spasm, pain)., Disp: 60 tablet, Rfl: 2    apixaban (ELIQUIS) 5 mg Tab, Take 5 mg by mouth 2 (two) times daily. (Patient not taking: Reported on 3/12/2025), Disp: , Rfl:     azithromycin (Z-PIPPA) 250 MG tablet, , Disp: , Rfl:     baclofen (LIORESAL) 5 mg Tab tablet, Take 1 tablet (5 mg total) by mouth 2 (two) times daily as needed (back pain). (Patient not taking: Reported on 3/12/2025), Disp: 60 tablet, Rfl: 2    ofloxacin (OCUFLOX) 0.3 % ophthalmic solution, , Disp: , Rfl:     predniSONE (DELTASONE) 20 MG tablet, , Disp: , Rfl:      Review of Systems: See HPI    Allergies:  Pcn [penicillins]     Medical History:   has a past medical history of Anxiety, Arthritis, GERD (gastroesophageal reflux disease), Pulmonary stenosis, and Urinary tract infection.    Surgical History:   has a past surgical history that includes Hip surgery (Right, 2020); Neck surgery (1979); Neck surgery (1983); Foot surgery (Left, 2019); and Elbow surgery (Right, 1980).    Family History:  family history includes Bladder Cancer in her father; Breast cancer in her mother; Cancer in her father and mother.    Social History:   reports that she has never smoked. She has never used smokeless tobacco. She reports current alcohol use. She reports current drug use. Drug: Marijuana.    Physical Exam:  /67 (Patient Position: Sitting)   Pulse 78   Ht 5' 6" (1.676 m)   Wt 58.5 kg (129 lb)   SpO2 98%   BMI 20.82 kg/m²   GEN: No acute distress. Calm, comfortable  HENT: Normocephalic, atraumatic, moist mucous membranes  EYE: Anicteric sclera, non-injected.   CV: Non-diaphoretic. Regular Rate. Radial Pulses 2+.  RESP: Breathing comfortably. Chest expansion symmetric.  EXT: No clubbing, cyanosis.   SKIN: Warm, & dry to palpation. No visible rashes or lesions of exposed skin.   PSYCH: Pleasant mood and appropriate affect. Recent and remote memory intact.   GAIT: Independent, normal ambulation  Neck " Exam:       Inspection: No erythema, bruising. Forward head and rounded shoulders      Palpation: (+) TTP of b/l cervical paraspinals, trapezius, rhomboids, levators, supraspinatus      ROM:  Limitation in all planes      Provocative Maneuvers:  (-) Spurling's bilaterally  Shoulder Exam:       Inspection: No erythema, bruising.       Palpation: (+) TTP of b/l subacromial space, b/l AC joints, b/l proximal biceps tendons.       ROM:  Limited in abduction, internal rotation b/l      Provocative Maneuvers:  (+) Hawkin's b/l       (+) Empty Can b/l  Lumbar Spine Exam:       Inspection: No erythema, bruising.       Palpation: (+) TTP of lumbar paraspinals and SIJ b/l      ROM:  Limited in flexion, extension, lateral bending.    Pain w/ flex > ext      (+) Facet loading bilaterally      (-) Straight Leg Raise bilaterally      (-) MORALES bilaterally  (+) Gaenslan's Test bilaterally  (+) Thigh thrust/Posterior Pelvic Pain Provocation test on right  (+) Sacral thrust w/ pain bilaterally   (+) Yeoman's test bilaterally  Hip Exam:      Inspection: No gross deformity or apparent leg length discrepancy      Palpation:  No TTP to bilateral greater trochanteric bursas, piriformis.       ROM:  No limitation or pain in internal rotation, external rotation b/l  Knee Exam:     Inspection: Effusion at right knee. No swelling on left, erythema, ecchymoses, or gross deformity.     Palpation: (+) TTP at medial joint line, lateral joint line on right     ROM: (-) Limitation in extension b/l. (+) Limitation in flexion on righ.      (+) Crepitus on right     Ligamentous Laxity: None apparent with Lachman, Posterior drawer, Varus/Valgus stress  Neurologic Exam:     Alert. Speech is fluent and appropriate.     Strength: 4/5 in b/l hip flexion, otherwise 5/5 throughout bilateral upper & lower extremities     Sensation:  Grossly intact to light touch in bilateral upper & lower extremities     Reflexes: 1+ in b/l patella, achilles, biceps,  brachioradialis, triceps     Tone: No abnormality appreciated in bilateral upper or lower extremities     (-) Jones bilaterally     No Clonus      Imaging:  -MRI Lumbar Spine 9/18/24        - X-ray lumbar spine complete w/ flex/ext 5/16/24:  There is mild levoscoliosis of the lumbar spine. Vertebral body heights are within normal limits. There is slight retrolisthesis of L2 on L3 and L3 on L4. There is moderate disc height loss from L2-3 through L5-S1. Mild disc height loss at L1-2. Multilevel facet arthropathy suspected. No fractures visualized. Visualized osseous structures are diffusely osteopenic. Prior right hip arthroplasty noted.     - X-ray lumbar spine 3/20/24:  There is mild levoscoliosis of the lumbar spine. Vertebral body heights are within normal limits. There is slight retrolisthesis of L2 on L3 and L3 on L4. There is moderate disc height loss from L2-3 through L5-S1. Mild disc height loss at L1-2. Multilevel facet arthropathy suspected. No fractures visualized. Visualized osseous structures are diffusely osteopenic. Prior right hip arthroplasty noted.     - X-ray cervical spine 3/20/24:  The patient is status post ACDF of C7-T1. Cervical lordosis appears somewhat accentuated. Vertebral body heights are within normal limits. There is slight retrolisthesis of C5 on C6. Moderate to severe disc height loss at C5-6. Mild-to-moderate disc height loss at C6-7. Probable mild disc height loss at C2-3 and C4-5. Multilevel bilateral facet arthropathy suspected. No fractures demonstrated. Odontoid process appears intact. Atlantoaxial articulations appear normal. Visualized osseous structures are diffusely osteopenic. Prevertebral soft tissues appear within normal limits.     - X-ray b/l shoulders 3/20/24:  There is no radiographic evidence of acute osseous, articular, or soft tissue abnormality. There is mild-to-moderate bilateral AC joint arthrosis. Bilateral glenohumeral osteoarthritis also noted, moderate on  the left and mild on the right. Periarticular calcific density seen adjacent to the proximal left humeral diaphysis may potentially represent an intra-articular body within the biceps tendon sheath. Postoperative findings are present in the visualized cervical spine. Visualized osseous structures appear diffusely osteopenic.     - MRI Cervical Spine 2/26/24:  Normal anatomic alignment.  Postop changes from C7-T1 ACDF.  Cervical spinal cord shows normal shape, volume and signal.  C1-C2:Anterior longitudinal interval degenerative arthritis and retrograde and ligamentous thickening. C1-C2 facet arthritis  C2-C3:Hypertrophic degenerative facet arthropathy much worse on the right side. 2 mm disc osteophyte bulge. The ligament flavum thickening is effacing the dorsal subarachnoid space and contacting the cord, but there is adequate CSF dorsal to the cord and overall no cord compression. Severe right foraminal stenosis. Mild left foraminal stenosis.  C3-C4:2 mm disc osteophyte bulge and ligament flavum hypertrophy produce mild central canal stenosis without any cord compression. Bilateral hypertrophic degenerative facet arthropathy and uncovertebral joint osteophytes more evident on the right side produces moderate right and mild left foraminal stenosis.  C4-C5:Moderate desiccation and degenerative loss in height of the disc. Left-sided hypertrophic degenerative facet arthropathy and uncovertebral joint osteophyte produces severe left neural foraminal stenosis. No central canal stenosis.  C5-C6:Moderate desiccation and degenerative loss in height of the disc. A 3.5 mm disc osteophyte bulge is slightly asymmetric to the right side along with ligamentum flavum hypertrophy produces mild/moderate central canal stenosis but no cord compression. Very large right uncovertebral joint osteophyte produces severe right neural foraminal stenosis along with degenerative facet arthropathy. Small left uncovertebral joint osteophyte  producing mild left foraminal stenosis.  C6-C7:Mild degenerative loss in height of the disc. 3 mm disc osteophyte bulge producing mild central canal stenosis without any cord compression. Bilateral uncovertebral joint osteophytes and mild hypertrophic degenerative facet arthropathy, worse on the right produces moderate right and mild left foraminal stenosis.  C7-T1:Central canal and neural foramina are well decompressed.  T1-T2: Mild degenerative facet arthropathy without any stenosis.      -MRI Lumbar Spine 12/22/21  Alignment: Levoscoliosis of 19 degrees is seen with the apex being at approximately the L2 level.  Vertebral bodies: Normal height and marrow signal. No transitional anatomy.  T12-L1: The disc is normal. The facets are normal. The central canal and neural foramen are normally maintained. No displacement or compression of the neural elements are seen.  L1-L2: There is loss of disc space height with desiccation. A concentric bulge measuring approximately 3 mm present. Degenerative changes are present in the facets consisting mainly of hypertrophic changes. There is mild narrowing of the central canal. Mild narrowing of the neural foramen is present bilaterally at the disc level. No displacement or compression of the neural elements are seen.  L2-L3: Loss of disc space height with desiccation is present. There is an approximate 2-3 mm concentric disc bulge. Degenerative changes are present in the facets. Moderate narrowing of the central canal is present. There is mild narrowing of the left neural foramen at the disc level. Moderate narrowing of the right neural foramen at both the infrapedicular and disc level is seen. No displacement or compression of the neural elements are seen.  L3-L4: Loss of disc space height with desiccation is present. There is an approximate 4 to 5 mm eccentric disc bulge lateralizing to the patient's right side. Degenerative changes are present in the facets. There is moderate  "narrowing of the central canal. Moderate to severe narrowing of the right subarticular recess is seen. There is mild narrowing of the right left neural foramen at the disc level. The exiting nerve root is normally maintained. Moderate narrowing of the right neural foramen at both the infrapedicular and disc level is seen. The exiting nerve root on this side appears normally maintained as well.  L4-L5: Loss of disc space height with desiccation is present. Eccentric disc bulge lateralizing to the patient's left side is seen. This measures a maximum of approximately 4 to 5 mm. Degenerative changes are present in the facets consisting mainly of hypertrophic changes. There is mild to moderate narrowing of the central canal. Moderate narrowing of the neural foramen is seen on the left at both the  infrapedicular and disc level. The right neural foramen appears fairly well-maintained. No displacement or compression of the neural elements are seen.  L5-S1: Loss of disc space height with desiccation is seen. There is a disc bulge present measuring approximately 5-6 mm. Degenerative changes are present in the facets consisting mainly of hypertrophic changes. There is mild narrowing of the central canal. Moderate narrowing of the neural foramen is present bilaterally at both the infrapedicular and disc level. No displacement or compression of the neural elements are seen.  Spinal cord: The cord extends down to the L1 level. The cord has a normal size, configuration and signal pattern.  Additional findings: None seen.      Labs:  BMP  No results found for: "NA", "K", "CL", "CO2", "BUN", "CREATININE", "CALCIUM", "ANIONGAP", "EGFRNORACEVR"  No results found for: "ALT", "AST", "GGT", "ALKPHOS", "BILITOT"  No results found for: "PLT"    Assessment:  Alexsandra Garcia is a 69 y.o. female with the following diagnoses based on history, exam, and imagin. Chronic bilateral low back pain without sciatica    2. Lumbar " spondylosis    3. Vertebrogenic low back pain    4. Iliotibial band syndrome, right    5. Trochanteric bursitis of both hips      This is a pleasant 69 y.o. lady presenting with:     - Chronic neck pain and bilateral shoulder/arm pains: Prior C7-T1 ACDF. There is severe foraminal stenosis on the right at C5-6 and on the left at C4-5 as well as diffuse facet arthropathy, appears most significant at C4-5 on the left and C5-6 on the right.   - Chronic bilateral shoulder pain: Multifactorial with contributions from C-spine as well. She has signs of shoulder impingement, AC joint arthropathy   - Chronic bilateral low back pain and some posterior thigh pain:   - I suspect her pain is primarily related to her facet joint arthropathy. There is pain localized over SIJ, but negative MORALES b/l. Potentially disc vs endplate pain     - Patient with moderate to severe chronic low back pain that is predominantly axial with no radicular component or neurogenic claudication.. The pain is severe enough to greatly impact quality of life &/or function as evidenced on the patients PEG score and NRS.    - Pain persists for greater than 3 months despite conservative treatment, including: Activity modification (avoiding exacerbating factors), physical therapy, physician-led home exercise program, and oral medications.    - Facet joints currently suspected as primary pain generator based on clinical assessment & radiology studies, as there is no fracture, tumor, infection, and significant deformity. There is NO surgical fusion (such as Anterior Lumbar Interbody Fusion) at the spinal segment to be targeted..   - This facet level has not undergone ablation in the previous 2 years.   - Comorbidities: Osteoporosis on Prolia infusions. Recurrent UTI on daily abx. GERD. HTN. Atrial septal defect. Anxiety. Pulmonary stenosis.     Treatment Plan:   - PT/OT/HEP: Cont HEP for neck and back pain. Discussed benefits of exercise for pain.   -  Procedures: Schedule diagnostic medial branch block of bilateral L4/5 & L5/S1 facet joints under fluoroscopic guidance with local sedation. (CPT Codes 96291, 23761, KX modifier). If MBB successful x 2, plan for RFA. The patient is not allergic to iodinated contrast. Patient is not currently taking blood thinners for cardiovascular prophylaxis   - Consider right GTB CSI   - Consider Intracept at L2-S1   - If limited relief, consider bl SIJ  - Can repeat C7-T1 IL JANETTE with local sedation PRN   - If limited relief, plan for bilateral C4-5, C5-6 diagnostic MBBs.  - Medications:    - Rx for baclofen 5 mg BID PRN  - Cont tizanidine 2-4 mg qHS to help with sleep and pain.   - Imaging: Reviewed. Requested addendum to MRI lumbar spine to include modic 2 changes at L2-3, L3-4, L4-5.  - Labs: None.      Follow Up: RTC after MBB or sooner PRN        Marlene Molina MD  Interventional Pain Medicine

## 2025-03-12 NOTE — PATIENT INSTRUCTIONS
Pre-Procedural Instructions  Interventional Pain  Medial Branch Block    Purpose:  We are performing diagnostic blocks of particular nerves in order to determine if performing a radiofrequency ablation (burning) of those nerves will provide relief of your pain.   The relief is temporary and used to determine the next step in your treatment  Please provide honest results in the amount of pain relief you obtained. Don't worry, you will not hurt our feelings.  We want to help relieve your pain and can potentially target different structures in order to provide you better relief.   Informed Consent:  These procedures are safe, but like any interventional procedure, it does carry rare risks which include:  Infection   Bleeding  Allergic Reactions  No relief of pain  Temporary sense of imbalance/dizziness can occur when performing cervical medial branch blocks, especially of the third occipital nerve.     Preparing for the procedure:    Tell your healthcare provider about all medicines you take. This includes over-the-counter medicines, herbs, vitamins, and other supplements.  Tell your healthcare provider about any other medical or dental procedures planned in proximity to your procedure.   Reduce Infection Risk:   Shower or bathe the night before and morning of your scheduled procedure.  Notify clinic if you are:  Having signs of illness, such as fevers, or signs of a urinary tract infection (UTI)  Prescribed antibiotics for an infection  Reduce bleeding risk:  For 7 days prior to procedure and during the duration of the trial (until your clinic follow-up):  Stop NSAIDs (ibuprofen/Advil, naproxen/Aleve, Meloxicam/Mobic, Goody's, or others)  Stop Luz [place], Pepto Bismol, & Herbal Medication/Supplements (such as Ginko, high dose Vitamin E, Fish Oil, Turmeric, Garlic, and others)  Home Support:  You MUST have a responsible  to bring you home from the facility and assist you at home on the day of the procedure    Plan to not be at work on the day of the procedure.   Medications:  Blood thinners: Such as: Aspirin, Plavix, Warfarin (Coumadin), Eliquis, Pradaxa, Xarelto, & others  If you are taking blood thinning medications, the clinic will notify you if you need to hold and of the number days to hold the medication prior to the procedure and when to restart these after the procedure. This will be communicated with and approved by your prescribing physician.   Please notify the office if you are taking blood thinning medications and are unsure if or when you need to hold the medication.   GLP-1 agonists: Semaglutide (Ozempic, Wegovy, Rybelsus), Tirzepatide (Mounjaro, Zepbound), Dulaglutide (Trulicity), Liraglutide, Lixisenatide, Exenatide  These medications can increase the risk of regurgitation and pulmonary aspiration of gastric contents during general anesthesia and deep sedation  If you take this weekly, please hold for 1 week prior to the procedure  If you take this daily, please hold on the day of procedure  If these are utilized for blood sugar control, you will need to discuss with your managing provider on what to utilize for bridging the antidiabetic therapy to maintain blood sugar control and avoiding hyperglycemia  Please take other regular medications as scheduled.  Diet:  If you will be receiving sedation for the procedure.  Do not eat anything for 8 hours prior to your procedure.   You may drink clear liquids up to 4 hours prior to your procedure.   Clear liquids include: water, black coffee, fruit juice without pulp, clear tea  You may drink water up to 2 hours prior to your procedure.  You may use a sip of water to take your regular medications  If you are NOT receiving sedation for the procedure:  Do not eat anything for 2 hours prior to your procedure. You may eat a light meal more than 2 hours prior to your procedure.   You may use a sip of water to take your regular medications  For Diabetic  Patients:  Please discuss with your managing physician the best way to take your medications on the procedure day to minimize large increases or decreases in your blood sugar  Please notify clinic of your most recent A1c and when that was performed. Optimizing your blood sugar control prior to the procedure will help decrease your risk of complications, such as infection.   Notify clinic and we will attempt to schedule your procedure as early in the morning as possible to minimize hypoglycemia that may occur while fasting for the procedure.  Please inform clinic if: Dr. Marlene Molina's clinic phone number is [phone]  You are pregnant or attempting to become pregnant  You have an implanted electronic device (pacemaker, defibrillator, medication pump, stimulator, etc.)  The electrical current utilized to coagulate the nerves can damage, disrupt or potentially cause a discharge of the electronic device.  You are having signs of infection noted above or are started on antibiotics.  You are allergic to iodinated contrast agents, local anesthetics, or steroids.  You are having other medical procedures around the time of your procedure (within 2 weeks)    Instructions for procedure day:    If you are not having any pain in the area that is going to be evaluated with the procedure, please call and cancel the procedure. The block will not provide valuable information if you are not having any pain.   We ask that you please leave your valuables at home  Avoid moisturizers or scented personal products  Wear loose fitting clothing that you can easily change in & out of  Plan to not be at work on the day of the procedure.   Please remove jewelry.  If you are having a procedure done on your head or neck, please remove any metallic items or hardware, such as dental hardware, jewelry that may obscure the images of the area during the procedure.     After the procedure: You will be sent to a recovery room after the procedure.  You will go home the same day.     Home Care Instructions after the procedure:    Injection site(s)  The injection sites may be covered with a dressing.  You may remove bandage at discharge from the hospital.   Bruising may be present  Avoid soaking or bathing in hot tub, bath or pool on the day of your procedure.   Showering is okay the day of procedure.   Avoid heat to the area  Notify the clinic if you are experiencing increased bleeding or drainage from the injection site(s)  Pain  Mild-moderate pain/discomfort may occur at the injection sites  Pain may be relieved with:  Ice  Tylenol (Acetaminophen)  Injection site pain typically improves after a day  Your baseline pain may improve immediately after the procedure:  Assess and note in your diary on a scale of 0 - 10/10, the intensity of pain at multiple time intervals on the day of your procedure.  Activity/Diet:  Day of the procedure:  Do NOT drive or operate heavy machinery  Resume daily life activities as tolerated. Do this slowly and carefully.  Bed rest is NOT recommended  Avoid strenuous activity, heavy lifting, bending or twisting.   Åvoid activity that requires skill, dexterity or coordination  As pain improves, you can carefully attempt activities that would exacerbate your pain and assess any functional benefits from the procedure.   If you received sedation - For 24 hrs following the procedure DO NOT:   Drive or operate heavy machinery  Drink alcohol  Make any important decisions  Dizziness, vertigo or balance difficulties may occur when having the procedure on the cervical spine (the neck). This happens because the procedure anesthetizes the proprioceptors (signals informing where you are in space)  This can be worsened when looking down or looking sideways  You can help compensate for this by utilizing your visual cues. Always focus on horizontal objects, such as window frames, door frames, or the horizon itself.  This typically improves within  15-30 minutes  Day after the procedure:   Resume daily life activities as tolerated: Let pain be your guide. Progress slowly and try not to over exert yourself if you are feeling good.   If possible, avoid activities that exacerbate your pain  Bed rest is NOT recommended   Physical Therapy (PT): You may restart your home exercise program the day following your procedure.  Diet: You may resume your normal diet as tolerated after the procedure  If nauseated, hold solid foods until nausea has resolved  Medications:  If you held any blood thinner medications for the procedure, you should have been given a specific timeline on when to restart the medication that has been determined in collaboration with your prescriber and our clinic. If you do not know when to restart, please notify clinic.  You may continue all other regular medications as prescribed.     When to call your healthcare provider [phone]  Call your healthcare provider if you have any of these symptoms:  Fever of 100.4°F (38.0°C) or higher. If you feel hot, take your temperature before notifying clinic.  New pain, weakness, tingling, or numbness in your legs. This may be expected in the first several hours after the procedure due to the local anesthetic used during the procedure.   New or worsening back pain   Redness, drainage or bleeding from site  Changes in bowel (incontinence) function  Changes in bladder (incontinence or retention) function  New or unusual headache &/or neck stiffness  Persistent nausea or vomiting with inability to tolerate clear liquids for more than 12 hours       When to seek medical attention  Call 911 right away if you have any of the following:  Chest pain  Shortness of breath  Signs of a stroke: Sudden changes in vision, changes in speech, sudden weakness in your face/arms/legs  Any other medical emergency     Follow-up: Post-procedure clinic appointment: ASAP after procedure to document benefit of procedure        Marlene Molina M.D.  [facility]  Phone: [phone]

## 2025-03-18 DIAGNOSIS — M47.816 LUMBAR SPONDYLOSIS: Primary | ICD-10-CM

## 2025-03-19 ENCOUNTER — TELEPHONE (OUTPATIENT)
Dept: PAIN MEDICINE | Facility: CLINIC | Age: 70
End: 2025-03-19

## 2025-03-19 NOTE — TELEPHONE ENCOUNTER
----- Message from Lelo sent at 3/19/2025 12:42 PM CDT -----  Contact: Alexsandra  Type:  Needs Medical AdviceWho Called: Alexsandra Symptoms (please be specific): How long has patient had these symptoms:  Pharmacy name and phone #:  Would the patient rather a call back or a response via [facility]? Call Back Best Call Back Number: [phone] Information: Patient states that she will not be able to make her appointment on today @ 3:30 pm. She will like to reschedule

## 2025-03-21 ENCOUNTER — OFFICE VISIT (OUTPATIENT)
Dept: PAIN MEDICINE | Facility: CLINIC | Age: 70
End: 2025-03-21
Payer: MEDICARE

## 2025-03-21 VITALS
WEIGHT: 129 LBS | SYSTOLIC BLOOD PRESSURE: 97 MMHG | HEART RATE: 71 BPM | DIASTOLIC BLOOD PRESSURE: 61 MMHG | BODY MASS INDEX: 20.73 KG/M2 | HEIGHT: 66 IN

## 2025-03-21 DIAGNOSIS — M47.816 LUMBAR SPONDYLOSIS: ICD-10-CM

## 2025-03-21 DIAGNOSIS — M25.561 CHRONIC PAIN OF RIGHT KNEE: Primary | ICD-10-CM

## 2025-03-21 DIAGNOSIS — M54.50 CHRONIC BILATERAL LOW BACK PAIN WITHOUT SCIATICA: ICD-10-CM

## 2025-03-21 DIAGNOSIS — G89.29 CHRONIC PAIN OF RIGHT KNEE: Primary | ICD-10-CM

## 2025-03-21 DIAGNOSIS — G89.29 CHRONIC BILATERAL LOW BACK PAIN WITHOUT SCIATICA: ICD-10-CM

## 2025-03-21 DIAGNOSIS — M54.51 VERTEBROGENIC LOW BACK PAIN: ICD-10-CM

## 2025-03-21 RX ORDER — MONTELUKAST SODIUM 10 MG/1
10 TABLET ORAL NIGHTLY
COMMUNITY

## 2025-03-21 NOTE — PROGRESS NOTES
[facility]      Chief Complaint:   Chief Complaint   Patient presents with    Low-back Pain    Knee Pain     right         History of Present Illness: Alexsandra Garcia is a 69 y.o. female referred by Eloisa Yusuf for neck pain.      Neck pain:  Onset: 1 year. She had prior C7-T1 anterior fusion   Location: neck  Radiation: back and bilateral shoulders  Timing: intermittent  Quality: Aching, Deep, and Stabbing  Exacerbating Factors: general activity  Alleviating Factors: massage and exercise/therapy  Associated Symptoms: She feels weakness in her arms and legs and numbness in both fingertips. She denies night fever/night sweats, urinary incontinence/change in function, bowel incontinence/change in function, and unexplained weight loss    Functional Limitations: Pain is limiting her driving, sleeping, getting dressed.    Patient has failed > 6 weeks of conservative treatment including: physical therapy and oral medications. She has been in PT for about a year.     Severity: Currently: 4/10   Typical Range: 4-10/10     Exacerbation: 10/10     P = 8  E = 6  G = 6  Baseline PEG Score = 6.67      Opioid Risk Score         Value Time User    Opioid Risk Score  1 3/20/2024  1:21 PM Ketty Ceballos LPN             Previous Interventions:  - 4/29/24: C7-T1 IL JANETTE with 90% relief    Previous Therapies:  PT/OT: yes   Relevant Surgery: yes    - C7-T1 anterior fusion    - Right hip replacement  Previous Medications:   - NSAIDS: tylenol.  - Muscle Relaxants: tizanidine   - TCAs: Elavil   - SNRIs: duloxetine   - Topicals:   - Anticonvulsants:    - Opioids: Hydrocodone       Interval History (09/03/2024):   Back pain in bilateral low back and does not radiate into legs. Pain worse with flexion more than with extension. No new weakness or numbness. No changes in b/b function.      Interval History (03/12/2025):  Alexsandra Garcia returns today for follow up.  At the last clinic visit, referred to physical therapy and  scheduled MBBs.     MBB note done due to her having a cardiac procedure.     Physical therapy provided 50% relief. She had heart procedure and now off of blood thinners.    Currently, the back pain is worse. She also is having some right knee pain and had MRI ordered of the right knee with her rheumatologist. SHe is also having right lateral hip pain.      Current Pain Scales:  Current: 6/10              Average: 6/10  Least-Worst: 4-8/10    Interval History (03/21/2025):  Alexsandra Garcia returns today for follow up.  At the last clinic visit, ordered imaging, Cont HEP for neck and back pain. Discussed benefits of exercise for pain, Schedule MBB of bilateral L4/5 & L5/S1 facet joints.  Rx for baclofen 5 mg BID PRN. Cont tizanidine 2-4 mg qHS to help with sleep and pain.     Right Knee pain has worsened in the past 2 weeks. Denies traumatic onset, but noticed onset of pain after prolonged walking in [place] within the past month. Pain is localized to the medial and lateral aspects of right knee. Pain is described as aching, stabbing. The pain is aggravated by walking, movement, walking up steps. The pain is alleviated by rest. Reports weakness. Denies numbness. (+) swelling. (+) popping. (-) locking, catching. (+) giving way. (+) feelings of instability. Denies recent fevers or infections. Denies unexplained weight loss.     She states her Rheumatologist ordered an MRI of knee, but did not review results with her.     Unclear if she is taking Baclofen that was prescribed last visit     Current Pain Scales:  Current: 7/10              Average: 3/10  Least-Worst: 3-10/10    Current PEG Score: 7     Current Pain Medications:  Duloxetine 60 mg  Norco  mg  Zanaflex 2 mg BID  Elavil 25 mg     Blood Thinners: none    Full Medication List:    Current Outpatient Medications:     baclofen (LIORESAL) 5 mg Tab tablet, Take 1 tablet (5 mg total) by mouth 2 (two) times daily as needed (back pain)., Disp: 60 tablet,  Rfl: 2    DULoxetine (CYMBALTA) 60 MG capsule, Take 60 mg by mouth once daily., Disp: , Rfl:     famotidine (PEPCID) 40 MG tablet, , Disp: , Rfl:     fluticasone propionate (FLONASE) 50 mcg/actuation nasal spray, 100 mcg by Nasal route., Disp: , Rfl:     HYDROcodone-acetaminophen (NORCO)  mg per tablet, hydrocodone 10 mg-acetaminophen 325 mg tablet, Disp: , Rfl:     hydrOXYzine HCL (ATARAX) 25 MG tablet, Take 25 mg by mouth 3 (three) times daily., Disp: , Rfl:     metoprolol succinate (TOPROL-XL) 25 MG 24 hr tablet, Take 12.5 mg by mouth once daily., Disp: , Rfl:     montelukast (SINGULAIR) 10 mg tablet, Take 10 mg by mouth every evening., Disp: , Rfl:     multivitamin (THERAGRAN) tablet, Take 1 tablet by mouth once daily., Disp: , Rfl:     nitrofurantoin, macrocrystal-monohydrate, (MACROBID) 100 MG capsule, Take 100 mg by mouth once daily., Disp: , Rfl:     pantoprazole (PROTONIX) 40 MG tablet, Take 40 mg by mouth., Disp: , Rfl:     rosuvastatin (CRESTOR) 20 MG tablet, Take 20 mg by mouth once daily., Disp: , Rfl:     spironolactone (ALDACTONE) 25 MG tablet, Take 25 mg by mouth once daily., Disp: , Rfl:     tiZANidine (ZANAFLEX) 4 MG tablet, Take 0.5-1 tablets (2-4 mg total) by mouth nightly as needed (spasm, pain)., Disp: 60 tablet, Rfl: 2    cholecalciferol, vitamin D3, (VITAMIN D3) 25 mcg (1,000 unit) capsule, Take 1,000 Units by mouth once daily., Disp: , Rfl:      Review of Systems: See HPI    Allergies:  Penicillins     Medical History:   has a past medical history of Anxiety, Arthritis, GERD (gastroesophageal reflux disease), Pulmonary stenosis, and Urinary tract infection.    Surgical History:   has a past surgical history that includes Hip surgery (Right, 2020); Neck surgery (1979); Neck surgery (1983); Foot surgery (Left, 2019); and Elbow surgery (Right, 1980).    Family History:  family history includes Bladder Cancer in her father; Breast cancer in her mother; Cancer in her father and  "mother.    Social History:   reports that she has never smoked. She has never used smokeless tobacco. She reports current alcohol use. She reports current drug use. Drug: Marijuana.    Physical Exam:  BP 97/61   Pulse 71   Ht 5' 6" (1.676 m)   Wt 58.5 kg (128 lb 15.5 oz)   BMI 20.82 kg/m²   GEN: No acute distress. Calm, comfortable  HENT: Normocephalic, atraumatic, moist mucous membranes  EYE: Anicteric sclera, non-injected.   CV: Non-diaphoretic. Regular Rate. Radial Pulses 2+.  RESP: Breathing comfortably. Chest expansion symmetric.  EXT: No clubbing, cyanosis.   SKIN: Warm, & dry to palpation. No visible rashes or lesions of exposed skin.   PSYCH: Pleasant mood and appropriate affect. Recent and remote memory intact.   GAIT: Independent, normal ambulation  Neck Exam:       Inspection: No erythema, bruising. Forward head and rounded shoulders      Palpation: (+) TTP of b/l cervical paraspinals, trapezius, rhomboids, levators, supraspinatus      ROM:  Limitation in all planes      Provocative Maneuvers:  (-) Spurling's bilaterally  Shoulder Exam:       Inspection: No erythema, bruising.       Palpation: (+) TTP of b/l subacromial space, b/l AC joints, b/l proximal biceps tendons.       ROM:  Limited in abduction, internal rotation b/l      Provocative Maneuvers:  (+) Hawkin's b/l       (+) Empty Can b/l  Lumbar Spine Exam:       Inspection: No erythema, bruising.       Palpation: (+) TTP of lumbar paraspinals and SIJ b/l      ROM:  Limited in flexion, extension, lateral bending.    Pain w/ flex > ext      (+) Facet loading bilaterally      (-) Straight Leg Raise bilaterally      (-) MORALES bilaterally  (+) Gaenslan's Test bilaterally  (+) Thigh thrust/Posterior Pelvic Pain Provocation test on right  (+) Sacral thrust w/ pain bilaterally   (+) Yeoman's test bilaterally  Hip Exam:      Inspection: No gross deformity or apparent leg length discrepancy      Palpation:  No TTP to bilateral greater trochanteric " bursas, piriformis.       ROM:  No limitation or pain in internal rotation, external rotation b/l  Knee Exam:     Inspection: Effusion at right knee. No swelling on left, erythema, ecchymoses, or gross deformity.     Palpation: (+) TTP at medial joint line, lateral joint line on right     ROM: (-) Limitation in extension b/l. (+) Limitation in flexion on righ.      (+) Crepitus on right     Ligamentous Laxity: None apparent with Lachman, Posterior drawer, Varus/Valgus stress  Neurologic Exam:     Alert. Speech is fluent and appropriate.     Strength: 4/5 in b/l hip flexion, otherwise 5/5 throughout bilateral upper & lower extremities     Sensation:  Grossly intact to light touch in bilateral upper & lower extremities     Reflexes: 1+ in b/l patella, achilles, biceps, brachioradialis, triceps     Tone: No abnormality appreciated in bilateral upper or lower extremities     (-) Jones bilaterally     No Clonus      Imaging:  -MRI Right knee 3/13/25:           - X-ray Bilateral knees 3/21/25:   3 views of both knees.  Medial lateral compartment are maintained bilaterally.  Small osteophytes involving the medial compartment on the right.  Tiny patellar osteophytes bilaterally.  No joint effusion.  No acute fracture or dislocation.     -MRI Lumbar Spine 9/18/24        - X-ray lumbar spine complete w/ flex/ext 5/16/24:  There is mild levoscoliosis of the lumbar spine. Vertebral body heights are within normal limits. There is slight retrolisthesis of L2 on L3 and L3 on L4. There is moderate disc height loss from L2-3 through L5-S1. Mild disc height loss at L1-2. Multilevel facet arthropathy suspected. No fractures visualized. Visualized osseous structures are diffusely osteopenic. Prior right hip arthroplasty noted.     - X-ray lumbar spine 3/20/24:  There is mild levoscoliosis of the lumbar spine. Vertebral body heights are within normal limits. There is slight retrolisthesis of L2 on L3 and L3 on L4. There is moderate  disc height loss from L2-3 through L5-S1. Mild disc height loss at L1-2. Multilevel facet arthropathy suspected. No fractures visualized. Visualized osseous structures are diffusely osteopenic. Prior right hip arthroplasty noted.     - X-ray cervical spine 3/20/24:  The patient is status post ACDF of C7-T1. Cervical lordosis appears somewhat accentuated. Vertebral body heights are within normal limits. There is slight retrolisthesis of C5 on C6. Moderate to severe disc height loss at C5-6. Mild-to-moderate disc height loss at C6-7. Probable mild disc height loss at C2-3 and C4-5. Multilevel bilateral facet arthropathy suspected. No fractures demonstrated. Odontoid process appears intact. Atlantoaxial articulations appear normal. Visualized osseous structures are diffusely osteopenic. Prevertebral soft tissues appear within normal limits.     - X-ray b/l shoulders 3/20/24:  There is no radiographic evidence of acute osseous, articular, or soft tissue abnormality. There is mild-to-moderate bilateral AC joint arthrosis. Bilateral glenohumeral osteoarthritis also noted, moderate on the left and mild on the right. Periarticular calcific density seen adjacent to the proximal left humeral diaphysis may potentially represent an intra-articular body within the biceps tendon sheath. Postoperative findings are present in the visualized cervical spine. Visualized osseous structures appear diffusely osteopenic.     - MRI Cervical Spine 2/26/24:  Normal anatomic alignment.  Postop changes from C7-T1 ACDF.  Cervical spinal cord shows normal shape, volume and signal.  C1-C2:Anterior longitudinal interval degenerative arthritis and retrograde and ligamentous thickening. C1-C2 facet arthritis  C2-C3:Hypertrophic degenerative facet arthropathy much worse on the right side. 2 mm disc osteophyte bulge. The ligament flavum thickening is effacing the dorsal subarachnoid space and contacting the cord, but there is adequate CSF dorsal to  the cord and overall no cord compression. Severe right foraminal stenosis. Mild left foraminal stenosis.  C3-C4:2 mm disc osteophyte bulge and ligament flavum hypertrophy produce mild central canal stenosis without any cord compression. Bilateral hypertrophic degenerative facet arthropathy and uncovertebral joint osteophytes more evident on the right side produces moderate right and mild left foraminal stenosis.  C4-C5:Moderate desiccation and degenerative loss in height of the disc. Left-sided hypertrophic degenerative facet arthropathy and uncovertebral joint osteophyte produces severe left neural foraminal stenosis. No central canal stenosis.  C5-C6:Moderate desiccation and degenerative loss in height of the disc. A 3.5 mm disc osteophyte bulge is slightly asymmetric to the right side along with ligamentum flavum hypertrophy produces mild/moderate central canal stenosis but no cord compression. Very large right uncovertebral joint osteophyte produces severe right neural foraminal stenosis along with degenerative facet arthropathy. Small left uncovertebral joint osteophyte producing mild left foraminal stenosis.  C6-C7:Mild degenerative loss in height of the disc. 3 mm disc osteophyte bulge producing mild central canal stenosis without any cord compression. Bilateral uncovertebral joint osteophytes and mild hypertrophic degenerative facet arthropathy, worse on the right produces moderate right and mild left foraminal stenosis.  C7-T1:Central canal and neural foramina are well decompressed.  T1-T2: Mild degenerative facet arthropathy without any stenosis.      -MRI Lumbar Spine 12/22/21  Alignment: Levoscoliosis of 19 degrees is seen with the apex being at approximately the L2 level.  Vertebral bodies: Normal height and marrow signal. No transitional anatomy.  T12-L1: The disc is normal. The facets are normal. The central canal and neural foramen are normally maintained. No displacement or compression of the neural  elements are seen.  L1-L2: There is loss of disc space height with desiccation. A concentric bulge measuring approximately 3 mm present. Degenerative changes are present in the facets consisting mainly of hypertrophic changes. There is mild narrowing of the central canal. Mild narrowing of the neural foramen is present bilaterally at the disc level. No displacement or compression of the neural elements are seen.  L2-L3: Loss of disc space height with desiccation is present. There is an approximate 2-3 mm concentric disc bulge. Degenerative changes are present in the facets. Moderate narrowing of the central canal is present. There is mild narrowing of the left neural foramen at the disc level. Moderate narrowing of the right neural foramen at both the infrapedicular and disc level is seen. No displacement or compression of the neural elements are seen.  L3-L4: Loss of disc space height with desiccation is present. There is an approximate 4 to 5 mm eccentric disc bulge lateralizing to the patient's right side. Degenerative changes are present in the facets. There is moderate narrowing of the central canal. Moderate to severe narrowing of the right subarticular recess is seen. There is mild narrowing of the right left neural foramen at the disc level. The exiting nerve root is normally maintained. Moderate narrowing of the right neural foramen at both the infrapedicular and disc level is seen. The exiting nerve root on this side appears normally maintained as well.  L4-L5: Loss of disc space height with desiccation is present. Eccentric disc bulge lateralizing to the patient's left side is seen. This measures a maximum of approximately 4 to 5 mm. Degenerative changes are present in the facets consisting mainly of hypertrophic changes. There is mild to moderate narrowing of the central canal. Moderate narrowing of the neural foramen is seen on the left at both the  infrapedicular and disc level. The right neural  "foramen appears fairly well-maintained. No displacement or compression of the neural elements are seen.  L5-S1: Loss of disc space height with desiccation is seen. There is a disc bulge present measuring approximately 5-6 mm. Degenerative changes are present in the facets consisting mainly of hypertrophic changes. There is mild narrowing of the central canal. Moderate narrowing of the neural foramen is present bilaterally at both the infrapedicular and disc level. No displacement or compression of the neural elements are seen.  Spinal cord: The cord extends down to the L1 level. The cord has a normal size, configuration and signal pattern.  Additional findings: None seen.      Labs:  BMP  No results found for: "NA", "K", "CL", "CO2", "BUN", "CREATININE", "CALCIUM", "ANIONGAP", "EGFRNORACEVR"  No results found for: "ALT", "AST", "GGT", "ALKPHOS", "BILITOT"  No results found for: "PLT"    Assessment:  Alexsandra Garcia is a 69 y.o. female with the following diagnoses based on history, exam, and imagin. Chronic pain of right knee  -     X-Ray Knee 3 View Bilateral; Future; Expected date: 2025    2. Lumbar spondylosis    3. Chronic bilateral low back pain without sciatica    4. Vertebrogenic low back pain        This is a pleasant 69 y.o. lady presenting with:     - Chronic neck pain and bilateral shoulder/arm pains: Prior C7-T1 ACDF. There is severe foraminal stenosis on the right at C5-6 and on the left at C4-5 as well as diffuse facet arthropathy, appears most significant at C4-5 on the left and C5-6 on the right.   - Chronic bilateral shoulder pain: Multifactorial with contributions from C-spine as well. She has signs of shoulder impingement, AC joint arthropathy   - Chronic bilateral low back pain and some posterior thigh pain:   - I suspect her pain is primarily related to her facet joint arthropathy. There is pain localized over SIJ, but negative MORALES b/l. Potentially disc vs endplate pain     - " Patient with moderate to severe chronic low back pain that is predominantly axial with no radicular component or neurogenic claudication.. The pain is severe enough to greatly impact quality of life &/or function as evidenced on the patients PEG score and NRS.    - Pain persists for greater than 3 months despite conservative treatment, including: Activity modification (avoiding exacerbating factors), physical therapy, physician-led home exercise program, and oral medications.    - Facet joints currently suspected as primary pain generator based on clinical assessment & radiology studies, as there is no fracture, tumor, infection, and significant deformity. There is NO surgical fusion (such as Anterior Lumbar Interbody Fusion) at the spinal segment to be targeted..   - This facet level has not undergone ablation in the previous 2 years.   - Right knee pain: Mild OA  - Comorbidities: Osteoporosis on Prolia infusions. Recurrent UTI on daily abx. GERD. HTN. Atrial septal defect. Anxiety. Pulmonary stenosis.     Treatment Plan:   - PT/OT/HEP: Cont HEP for neck and back pain. Discussed benefits of exercise for pain.   - Procedures: Schedule diagnostic medial branch block of bilateral L4/5 & L5/S1 facet joints under fluoroscopic guidance with local sedation. (CPT Codes 52024, 35584, KX modifier). If MBB successful x 2, plan for RFA. The patient is not allergic to iodinated contrast. Patient is not currently taking blood thinners for cardiovascular prophylaxis   - Consider right GTB CSI   - Consider Intracept at L2-S1   - If limited relief, consider bl SIJ  - Can repeat C7-T1 IL JANETTE with local sedation PRN   - If limited relief, plan for bilateral C4-5, C5-6 diagnostic MBBs.  - Medications:    - Cont baclofen 5 mg BID PRN  - Cont tizanidine 2-4 mg qHS to help with sleep and pain.   - Imaging: Reviewed. Ordered x-ray of bilateral knees.    - Request MRI report of right knee.  - Requested addendum to MRI lumbar spine to  include modic 2 changes at L2-3, L3-4, L4-5.  - Labs: Reviewed. Medications are appropriately dosed for current hepatorenal function.      Follow Up: RTC after MBB or sooner PRN        Allison Acevedo PA-C  Interventional Pain Medicine              unknown

## 2025-03-31 ENCOUNTER — OUTSIDE PLACE OF SERVICE (OUTPATIENT)
Dept: PAIN MEDICINE | Facility: CLINIC | Age: 70
End: 2025-03-31
Payer: MEDICARE

## 2025-04-01 ENCOUNTER — OFFICE VISIT (OUTPATIENT)
Dept: PAIN MEDICINE | Facility: CLINIC | Age: 70
End: 2025-04-01
Payer: MEDICARE

## 2025-04-01 VITALS
OXYGEN SATURATION: 99 % | SYSTOLIC BLOOD PRESSURE: 96 MMHG | WEIGHT: 130 LBS | HEART RATE: 74 BPM | HEIGHT: 66 IN | DIASTOLIC BLOOD PRESSURE: 62 MMHG | BODY MASS INDEX: 20.89 KG/M2

## 2025-04-01 DIAGNOSIS — M25.562 CHRONIC PAIN OF BOTH KNEES: ICD-10-CM

## 2025-04-01 DIAGNOSIS — G89.29 INSOMNIA SECONDARY TO CHRONIC PAIN: ICD-10-CM

## 2025-04-01 DIAGNOSIS — G89.29 CHRONIC PAIN OF BOTH SHOULDERS: Primary | ICD-10-CM

## 2025-04-01 DIAGNOSIS — G47.01 INSOMNIA SECONDARY TO CHRONIC PAIN: ICD-10-CM

## 2025-04-01 DIAGNOSIS — M22.2X1 PATELLOFEMORAL PAIN SYNDROME OF BOTH KNEES: ICD-10-CM

## 2025-04-01 DIAGNOSIS — G89.29 CHRONIC PAIN OF BOTH KNEES: ICD-10-CM

## 2025-04-01 DIAGNOSIS — M25.561 CHRONIC PAIN OF BOTH KNEES: ICD-10-CM

## 2025-04-01 DIAGNOSIS — M25.511 CHRONIC PAIN OF BOTH SHOULDERS: Primary | ICD-10-CM

## 2025-04-01 DIAGNOSIS — M54.50 CHRONIC BILATERAL LOW BACK PAIN WITHOUT SCIATICA: ICD-10-CM

## 2025-04-01 DIAGNOSIS — M17.0 PRIMARY OSTEOARTHRITIS OF BOTH KNEES: ICD-10-CM

## 2025-04-01 DIAGNOSIS — M25.512 CHRONIC PAIN OF BOTH SHOULDERS: Primary | ICD-10-CM

## 2025-04-01 DIAGNOSIS — G89.29 CHRONIC BILATERAL LOW BACK PAIN WITHOUT SCIATICA: ICD-10-CM

## 2025-04-01 DIAGNOSIS — M54.51 VERTEBROGENIC LOW BACK PAIN: ICD-10-CM

## 2025-04-01 DIAGNOSIS — M47.816 LUMBAR SPONDYLOSIS: ICD-10-CM

## 2025-04-01 DIAGNOSIS — M22.2X2 PATELLOFEMORAL PAIN SYNDROME OF BOTH KNEES: ICD-10-CM

## 2025-04-01 DIAGNOSIS — M76.31 ILIOTIBIAL BAND SYNDROME, RIGHT: ICD-10-CM

## 2025-04-01 PROCEDURE — 99214 OFFICE O/P EST MOD 30 MIN: CPT | Mod: S$PBB,,, | Performed by: PHYSICAL MEDICINE & REHABILITATION

## 2025-04-01 RX ORDER — HYDROXYZINE PAMOATE 25 MG/1
25 CAPSULE ORAL DAILY PRN
COMMUNITY

## 2025-04-01 RX ORDER — CETIRIZINE HYDROCHLORIDE 10 MG/1
10 TABLET ORAL DAILY
COMMUNITY

## 2025-04-01 NOTE — PROGRESS NOTES
[facility]      Chief Complaint:   Chief Complaint   Patient presents with    Knee Pain    Low-back Pain       History of Present Illness: Alexsandra Garcia is a 69 y.o. female referred by Eloisa Yusuf for neck pain.      Neck pain:  Onset: 1 year. She had prior C7-T1 anterior fusion   Location: neck  Radiation: back and bilateral shoulders  Timing: intermittent  Quality: Aching, Deep, and Stabbing  Exacerbating Factors: general activity  Alleviating Factors: massage and exercise/therapy  Associated Symptoms: She feels weakness in her arms and legs and numbness in both fingertips. She denies night fever/night sweats, urinary incontinence/change in function, bowel incontinence/change in function, and unexplained weight loss    Functional Limitations: Pain is limiting her driving, sleeping, getting dressed.    Patient has failed > 6 weeks of conservative treatment including: physical therapy and oral medications. She has been in PT for about a year.     Severity: Currently: 4/10   Typical Range: 4-10/10     Exacerbation: 10/10     P = 8  E = 6  G = 6  Baseline PEG Score = 6.67    Opioid Risk Score         Value Time User    Opioid Risk Score  1 3/20/2024  1:21 PM Ketty Ceballos LPN           Previous Therapies:  PT/OT: yes   Relevant Surgery: yes    - C7-T1 anterior fusion    - Right hip replacement  Previous Medications:   - NSAIDS: tylenol.  - Muscle Relaxants: tizanidine   - TCAs: Elavil   - SNRIs: duloxetine   - Topicals:   - Anticonvulsants:    - Opioids: Hydrocodone       Previous Interventions:  - 3/31/25: B/L L4-5, L5-S1 diagnostic MBB w/ 100% relief  - 4/29/24: C7-T1 IL JANETTE with 90% relief    Interval History (09/03/2024):   Back pain in bilateral low back and does not radiate into legs. Pain worse with flexion more than with extension. No new weakness or numbness. No changes in b/b function.      Interval History (03/21/2025):  Right Knee pain has worsened in the past 2 weeks. Denies traumatic  onset, but noticed onset of pain after prolonged walking in [place] within the past month. Pain is localized to the medial and lateral aspects of right knee. Pain is described as aching, stabbing. The pain is aggravated by walking, movement, walking up steps. The pain is alleviated by rest. Reports weakness. Denies numbness. (+) swelling. (+) popping. (-) locking, catching. (+) giving way. (+) feelings of instability. Denies recent fevers or infections. Denies unexplained weight loss.     Interval History (04/01/2025):  Alexsandra Garcia returns today for follow up.  At the last clinic visit, scheduled diagnostic MBB     The bilateral L4-5, L5-S1 diagnostic MBB provided 100% relief (Pain scale went from 6/10 pre-procedure to post-procedure pain of 0/10 during anesthetic effect for several hrs following procedure).     Currently, the low back pain is stable.  However, her knee pain is the worst. She would rather focus on that today. She is feeling pain in both knees.    The baclofen is helping and tizanidine is helping and are tolerated.     Current Pain Scales:  Current: 1/10              Average: 5/10  Least-Worst: 4-8/10    Current PEG Score: 6.33     Current Pain Medications:  Duloxetine 60 mg  Norco  mg  Zanaflex 2 mg BID  Elavil 25 mg   Baclofen    Blood Thinners: none    Full Medication List:    Current Outpatient Medications:     baclofen (LIORESAL) 5 mg Tab tablet, Take 1 tablet (5 mg total) by mouth 2 (two) times daily as needed (back pain)., Disp: 60 tablet, Rfl: 2    cetirizine (ZYRTEC) 10 MG tablet, Take 10 mg by mouth once daily., Disp: , Rfl:     cholecalciferol, vitamin D3, (VITAMIN D3) 25 mcg (1,000 unit) capsule, Take 1,000 Units by mouth once daily., Disp: , Rfl:     DULoxetine (CYMBALTA) 60 MG capsule, Take 60 mg by mouth once daily., Disp: , Rfl:     famotidine (PEPCID) 40 MG tablet, , Disp: , Rfl:     fluticasone propionate (FLONASE) 50 mcg/actuation nasal spray, 100 mcg by Nasal  route., Disp: , Rfl:     HYDROcodone-acetaminophen (NORCO)  mg per tablet, hydrocodone 10 mg-acetaminophen 325 mg tablet, Disp: , Rfl:     hydrOXYzine pamoate (VISTARIL) 25 MG Cap, Take 25 mg by mouth daily as needed., Disp: , Rfl:     metoprolol succinate (TOPROL-XL) 25 MG 24 hr tablet, Take 12.5 mg by mouth once daily., Disp: , Rfl:     montelukast (SINGULAIR) 10 mg tablet, Take 10 mg by mouth every evening., Disp: , Rfl:     multivitamin (THERAGRAN) tablet, Take 1 tablet by mouth once daily., Disp: , Rfl:     nitrofurantoin, macrocrystal-monohydrate, (MACROBID) 100 MG capsule, Take 100 mg by mouth once daily., Disp: , Rfl:     pantoprazole (PROTONIX) 40 MG tablet, Take 40 mg by mouth., Disp: , Rfl:     spironolactone (ALDACTONE) 25 MG tablet, Take 25 mg by mouth once daily., Disp: , Rfl:     tiZANidine (ZANAFLEX) 4 MG tablet, Take 0.5-1 tablets (2-4 mg total) by mouth nightly as needed (spasm, pain)., Disp: 60 tablet, Rfl: 2    hydrOXYzine HCL (ATARAX) 25 MG tablet, Take 25 mg by mouth 3 (three) times daily. (Patient not taking: Reported on 4/1/2025), Disp: , Rfl:     rosuvastatin (CRESTOR) 20 MG tablet, Take 20 mg by mouth once daily., Disp: , Rfl:      Review of Systems: See HPI    Allergies:  Penicillins     Medical History:   has a past medical history of Anxiety, Arthritis, GERD (gastroesophageal reflux disease), Pulmonary stenosis, and Urinary tract infection.    Surgical History:   has a past surgical history that includes Hip surgery (Right, 2020); Neck surgery (1979); Neck surgery (1983); Foot surgery (Left, 2019); and Elbow surgery (Right, 1980).    Family History:  family history includes Bladder Cancer in her father; Breast cancer in her mother; Cancer in her father and mother.    Social History:   reports that she has never smoked. She has never used smokeless tobacco. She reports current alcohol use. She reports current drug use. Drug: Marijuana.    Physical Exam:  BP 96/62 (Patient Position:  "Sitting)   Pulse 74   Ht 5' 6" (1.676 m)   Wt 59 kg (130 lb)   SpO2 99%   BMI 20.98 kg/m²   GEN: No acute distress. Calm, comfortable  HENT: Normocephalic, atraumatic, moist mucous membranes  EYE: Anicteric sclera, non-injected.   CV: Non-diaphoretic. Regular Rate. Radial Pulses 2+.  RESP: Breathing comfortably. Chest expansion symmetric.  EXT: No clubbing, cyanosis.   SKIN: Warm, & dry to palpation. No visible rashes or lesions of exposed skin.   PSYCH: Pleasant mood and appropriate affect. Recent and remote memory intact.   GAIT: Independent, normal ambulation  Neck Exam:       Inspection: No erythema, bruising. Forward head and rounded shoulders      Palpation: (+) TTP of b/l cervical paraspinals, trapezius, rhomboids, levators, supraspinatus      ROM:  Limitation in all planes      Provocative Maneuvers:  (-) Spurling's bilaterally  Shoulder Exam:       Inspection: No erythema, bruising.       Palpation: (+) TTP of b/l subacromial space, b/l AC joints, b/l proximal biceps tendons.       ROM:  Limited in abduction, internal rotation b/l      Provocative Maneuvers:  (+) Hawkin's b/l       (+) Empty Can b/l  Lumbar Spine Exam:       Inspection: No erythema, bruising.       Palpation: (+) TTP of lumbar paraspinals and SIJ b/l      ROM:  Limited in flexion, extension, lateral bending.    Pain w/ flex > ext      (+) Facet loading bilaterally      (-) Straight Leg Raise bilaterally      (-) MORALES bilaterally  (+) Gaenslan's Test bilaterally  (+) Thigh thrust/Posterior Pelvic Pain Provocation test on right  (+) Sacral thrust w/ pain bilaterally   (+) Yeoman's test bilaterally  Hip Exam:      Inspection: No gross deformity or apparent leg length discrepancy      Palpation:  No TTP to bilateral greater trochanteric bursas, piriformis.       ROM:  No limitation or pain in internal rotation, external rotation b/l  Knee Exam:     Inspection: Effusion at right knee. No swelling on left, erythema, ecchymoses, or gross " deformity.     Palpation: (+) TTP at medial joint line, lateral joint line on right     ROM: (-) Limitation in extension b/l. (+) Limitation in flexion on righ.      (+) Crepitus on right     Ligamentous Laxity: None apparent with Lachman, Posterior drawer, Varus/Valgus stress  Neurologic Exam:     Alert. Speech is fluent and appropriate.     Strength: 4/5 in b/l hip flexion, otherwise 5/5 throughout bilateral upper & lower extremities     Sensation:  Grossly intact to light touch in bilateral upper & lower extremities     Reflexes: 1+ in b/l patella, achilles, biceps, brachioradialis, triceps     Tone: No abnormality appreciated in bilateral upper or lower extremities     (-) Jones bilaterally     No Clonus      Imaging:  -MRI Right knee 3/13/25:           - X-ray Bilateral knees 3/21/25:   3 views of both knees.  Medial lateral compartment are maintained bilaterally.  Small osteophytes involving the medial compartment on the right.  Tiny patellar osteophytes bilaterally.  No joint effusion.  No acute fracture or dislocation.     -MRI Lumbar Spine 9/18/24        - X-ray lumbar spine complete w/ flex/ext 5/16/24:  There is mild levoscoliosis of the lumbar spine. Vertebral body heights are within normal limits. There is slight retrolisthesis of L2 on L3 and L3 on L4. There is moderate disc height loss from L2-3 through L5-S1. Mild disc height loss at L1-2. Multilevel facet arthropathy suspected. No fractures visualized. Visualized osseous structures are diffusely osteopenic. Prior right hip arthroplasty noted.     - X-ray lumbar spine 3/20/24:  There is mild levoscoliosis of the lumbar spine. Vertebral body heights are within normal limits. There is slight retrolisthesis of L2 on L3 and L3 on L4. There is moderate disc height loss from L2-3 through L5-S1. Mild disc height loss at L1-2. Multilevel facet arthropathy suspected. No fractures visualized. Visualized osseous structures are diffusely osteopenic. Prior  right hip arthroplasty noted.     - X-ray cervical spine 3/20/24:  The patient is status post ACDF of C7-T1. Cervical lordosis appears somewhat accentuated. Vertebral body heights are within normal limits. There is slight retrolisthesis of C5 on C6. Moderate to severe disc height loss at C5-6. Mild-to-moderate disc height loss at C6-7. Probable mild disc height loss at C2-3 and C4-5. Multilevel bilateral facet arthropathy suspected. No fractures demonstrated. Odontoid process appears intact. Atlantoaxial articulations appear normal. Visualized osseous structures are diffusely osteopenic. Prevertebral soft tissues appear within normal limits.     - X-ray b/l shoulders 3/20/24:  There is no radiographic evidence of acute osseous, articular, or soft tissue abnormality. There is mild-to-moderate bilateral AC joint arthrosis. Bilateral glenohumeral osteoarthritis also noted, moderate on the left and mild on the right. Periarticular calcific density seen adjacent to the proximal left humeral diaphysis may potentially represent an intra-articular body within the biceps tendon sheath. Postoperative findings are present in the visualized cervical spine. Visualized osseous structures appear diffusely osteopenic.     - MRI Cervical Spine 2/26/24:  Normal anatomic alignment.  Postop changes from C7-T1 ACDF.  Cervical spinal cord shows normal shape, volume and signal.  C1-C2:Anterior longitudinal interval degenerative arthritis and retrograde and ligamentous thickening. C1-C2 facet arthritis  C2-C3:Hypertrophic degenerative facet arthropathy much worse on the right side. 2 mm disc osteophyte bulge. The ligament flavum thickening is effacing the dorsal subarachnoid space and contacting the cord, but there is adequate CSF dorsal to the cord and overall no cord compression. Severe right foraminal stenosis. Mild left foraminal stenosis.  C3-C4:2 mm disc osteophyte bulge and ligament flavum hypertrophy produce mild central canal  stenosis without any cord compression. Bilateral hypertrophic degenerative facet arthropathy and uncovertebral joint osteophytes more evident on the right side produces moderate right and mild left foraminal stenosis.  C4-C5:Moderate desiccation and degenerative loss in height of the disc. Left-sided hypertrophic degenerative facet arthropathy and uncovertebral joint osteophyte produces severe left neural foraminal stenosis. No central canal stenosis.  C5-C6:Moderate desiccation and degenerative loss in height of the disc. A 3.5 mm disc osteophyte bulge is slightly asymmetric to the right side along with ligamentum flavum hypertrophy produces mild/moderate central canal stenosis but no cord compression. Very large right uncovertebral joint osteophyte produces severe right neural foraminal stenosis along with degenerative facet arthropathy. Small left uncovertebral joint osteophyte producing mild left foraminal stenosis.  C6-C7:Mild degenerative loss in height of the disc. 3 mm disc osteophyte bulge producing mild central canal stenosis without any cord compression. Bilateral uncovertebral joint osteophytes and mild hypertrophic degenerative facet arthropathy, worse on the right produces moderate right and mild left foraminal stenosis.  C7-T1:Central canal and neural foramina are well decompressed.  T1-T2: Mild degenerative facet arthropathy without any stenosis.      -MRI Lumbar Spine 12/22/21  Alignment: Levoscoliosis of 19 degrees is seen with the apex being at approximately the L2 level.  Vertebral bodies: Normal height and marrow signal. No transitional anatomy.  T12-L1: The disc is normal. The facets are normal. The central canal and neural foramen are normally maintained. No displacement or compression of the neural elements are seen.  L1-L2: There is loss of disc space height with desiccation. A concentric bulge measuring approximately 3 mm present. Degenerative changes are present in the facets consisting  mainly of hypertrophic changes. There is mild narrowing of the central canal. Mild narrowing of the neural foramen is present bilaterally at the disc level. No displacement or compression of the neural elements are seen.  L2-L3: Loss of disc space height with desiccation is present. There is an approximate 2-3 mm concentric disc bulge. Degenerative changes are present in the facets. Moderate narrowing of the central canal is present. There is mild narrowing of the left neural foramen at the disc level. Moderate narrowing of the right neural foramen at both the infrapedicular and disc level is seen. No displacement or compression of the neural elements are seen.  L3-L4: Loss of disc space height with desiccation is present. There is an approximate 4 to 5 mm eccentric disc bulge lateralizing to the patient's right side. Degenerative changes are present in the facets. There is moderate narrowing of the central canal. Moderate to severe narrowing of the right subarticular recess is seen. There is mild narrowing of the right left neural foramen at the disc level. The exiting nerve root is normally maintained. Moderate narrowing of the right neural foramen at both the infrapedicular and disc level is seen. The exiting nerve root on this side appears normally maintained as well.  L4-L5: Loss of disc space height with desiccation is present. Eccentric disc bulge lateralizing to the patient's left side is seen. This measures a maximum of approximately 4 to 5 mm. Degenerative changes are present in the facets consisting mainly of hypertrophic changes. There is mild to moderate narrowing of the central canal. Moderate narrowing of the neural foramen is seen on the left at both the  infrapedicular and disc level. The right neural foramen appears fairly well-maintained. No displacement or compression of the neural elements are seen.  L5-S1: Loss of disc space height with desiccation is seen. There is a disc bulge present  "measuring approximately 5-6 mm. Degenerative changes are present in the facets consisting mainly of hypertrophic changes. There is mild narrowing of the central canal. Moderate narrowing of the neural foramen is present bilaterally at both the infrapedicular and disc level. No displacement or compression of the neural elements are seen.  Spinal cord: The cord extends down to the L1 level. The cord has a normal size, configuration and signal pattern.  Additional findings: None seen.      Labs:  BMP  No results found for: "NA", "K", "CL", "CO2", "BUN", "CREATININE", "CALCIUM", "ANIONGAP", "EGFRNORACEVR"  No results found for: "ALT", "AST", "GGT", "ALKPHOS", "BILITOT"  No results found for: "PLT"    Assessment:  Alexsandra Garcia is a 69 y.o. female with the following diagnoses based on history, exam, and imagin. Chronic pain of both shoulders    2. Chronic bilateral low back pain without sciatica    3. Chronic pain of both knees    4. Vertebrogenic low back pain    5. Lumbar spondylosis    6. Insomnia secondary to chronic pain    7. Iliotibial band syndrome, right    8. Patellofemoral pain syndrome of both knees    9. Primary osteoarthritis of both knees          This is a pleasant 69 y.o. lady presenting with:     - Chronic neck pain and bilateral shoulder/arm pains:   - Prior C7-T1 ACDF.   - On imaging, there is severe foraminal stenosis on the right at C5-6 and on the left at C4-5 as well as diffuse facet arthropathy, appears most significant at C4-5 on the left and C5-6 on the right.   - Chronic bilateral shoulder pain:   - Multifactorial with contributions from C-spine as well.   - She has signs of shoulder impingement, AC joint arthropathy   - Chronic bilateral low back pain and some posterior thigh pain:   - I suspect her pain is primarily related to her facet joint arthropathy. There is pain localized over SIJ, but negative MORALES b/l. Potentially disc vs endplate pain     - Patient with moderate to " severe chronic low back pain that is predominantly axial with no radicular component or neurogenic claudication.. The pain is severe enough to greatly impact quality of life &/or function as evidenced on the patients PEG score and NRS.    - Pain persists for greater than 3 months despite conservative treatment, including: Activity modification (avoiding exacerbating factors), physical therapy, physician-led home exercise program, and oral medications.    - Facet joints currently suspected as primary pain generator based on clinical assessment & radiology studies, as there is no fracture, tumor, infection, and significant deformity. There is NO surgical fusion (such as Anterior Lumbar Interbody Fusion) at the spinal segment to be targeted..   - This facet level has not undergone ablation in the previous 2 years.     - Patient reports greater than or equal to 80% relief from previous MBB at this segment with preprocedure pain score noted as 6/10 and post-procedure pain score noted as 0/10 with consistent relief for duration of local anesthetic (2-3 hrs).   - She was able to move around better and wants to re-attempt PT to also help with her knee pain.   - Bilateral knee pain:   - OA, primarily patellofemoral.    - Right knee meniscal tear, ITB syndrome  - Comorbidities: Osteoporosis on Prolia infusions. Recurrent UTI on daily abx. GERD. HTN. Atrial septal defect. Anxiety. Pulmonary stenosis.     Treatment Plan:   - PT/OT/HEP: Refer to PT prior to #2/2 MBB.   - Cont HEP for neck and back pain. Discussed benefits of exercise for pain.   - Procedures:   - If back pain persists despite PT, plan for #2/2 diagnostic medial branch block of bilateral L4/5 & L5/S1 facet joints under fluoroscopic guidance with local sedation. (CPT Codes 80297, 97130, KX modifier). If MBB successful x 2, plan for RFA. The patient is not allergic to iodinated contrast. Patient is not currently taking blood thinners for cardiovascular  prophylaxis   - Consider knee durolane or synvisc-1 injection  - Consider Intracept at L2-S1   - If limited relief, consider bl SIJ  - Can repeat C7-T1 IL JANETTE with local sedation PRN   - If limited relief, plan for bilateral C4-5, C5-6 diagnostic MBBs.  - Medications:    - Cont baclofen 5 mg BID PRN  - Cont tizanidine 2-4 mg qHS to help with sleep and pain.   - Imaging: Reviewed.   - Requested addendum to MRI lumbar spine to include modic 2 changes at L2-3, L3-4, L4-5.  - Labs: Reviewed. Medications are appropriately dosed for current hepatorenal function.      Follow Up: RTC in 6-8 weeks after PT or sooner PRN      Marlene Molina MD  Interventional Pain Medicine

## 2025-04-04 ENCOUNTER — TELEPHONE (OUTPATIENT)
Dept: PAIN MEDICINE | Facility: CLINIC | Age: 70
End: 2025-04-04
Payer: MEDICARE

## 2025-04-04 NOTE — TELEPHONE ENCOUNTER
Were you wanting to refer pt to PT?  I do see this being documented in the notes from last ov, but no order attached.     Will route to Dr Molina to advise.

## 2025-04-04 NOTE — TELEPHONE ENCOUNTER
----- Message from Shalini sent at 4/4/2025  9:03 AM CDT -----  Contact: ROBERT PUENTE [[id number]]  ...Type:  Patient Requesting OrdersWho Called: ROBERT PUENTE [[id number]]What order(s)is the call regarding?: pt needs a referral sent over to the physical therapy [facility].  Would the patient rather a call back or a response via [facility]?  [facility] [employer] Call Back Number: [phone] (home) Additional Information:

## 2025-04-07 ENCOUNTER — TELEPHONE (OUTPATIENT)
Dept: PAIN MEDICINE | Facility: CLINIC | Age: 70
End: 2025-04-07
Payer: MEDICARE

## 2025-04-07 NOTE — TELEPHONE ENCOUNTER
----- Message from So sent at 4/7/2025  9:39 AM CDT -----  Contact: self  Type:  Patient Returning CallWho Called:Alexsandra Da Silva Left Message for Patient:unsureDoes the patient know what this is regarding?:orders for [facility]/Would the patient rather a call back or a response via [facility]? [facility] Call Back Number:[phone] Information: knee therapy

## 2025-04-08 ENCOUNTER — TELEPHONE (OUTPATIENT)
Dept: PAIN MEDICINE | Facility: CLINIC | Age: 70
End: 2025-04-08
Payer: MEDICARE

## 2025-04-08 ENCOUNTER — RESULTS FOLLOW-UP (OUTPATIENT)
Dept: UROLOGY | Facility: CLINIC | Age: 70
End: 2025-04-08

## 2025-04-08 ENCOUNTER — TELEPHONE (OUTPATIENT)
Dept: UROLOGY | Facility: CLINIC | Age: 70
End: 2025-04-08
Payer: MEDICARE

## 2025-04-08 NOTE — TELEPHONE ENCOUNTER
Called and spoke with pt and informed of results.    ----- Message from Dino Wood MD sent at 4/8/2025  3:54 PM CDT -----  Please inform the patient that the renal ultrasound is completely normal there are no lesions or masses  ----- Message -----  From: Ariana Cagle LPN  Sent: 4/8/2025   3:50 PM CDT  To: Dino Wood MD

## 2025-04-08 NOTE — TELEPHONE ENCOUNTER
----- Message from Nurse Ketty sent at 4/7/2025  4:58 PM CDT -----  Contact: pt    ----- Message -----  From: Carly Rodrigues MA  Sent: 4/7/2025   4:51 PM CDT  To: Ketty Ceballos LPN    Routed incorrectly  ----- Message -----  From: Danya Platt  Sent: 4/7/2025   3:22 PM CDT  To: Tracy Arias Staff    Pt calling for nurse and did not leave message just wanted a call back and can be reached at [phone] Thanks,

## 2025-05-06 ENCOUNTER — OFFICE VISIT (OUTPATIENT)
Dept: PAIN MEDICINE | Facility: CLINIC | Age: 70
End: 2025-05-06
Payer: MEDICARE

## 2025-05-06 VITALS
WEIGHT: 130 LBS | DIASTOLIC BLOOD PRESSURE: 72 MMHG | HEIGHT: 66 IN | SYSTOLIC BLOOD PRESSURE: 108 MMHG | HEART RATE: 71 BPM | OXYGEN SATURATION: 98 % | BODY MASS INDEX: 20.89 KG/M2

## 2025-05-06 DIAGNOSIS — G89.29 CHRONIC PAIN OF BOTH KNEES: ICD-10-CM

## 2025-05-06 DIAGNOSIS — M25.562 CHRONIC PAIN OF BOTH KNEES: ICD-10-CM

## 2025-05-06 DIAGNOSIS — M17.0 PRIMARY OSTEOARTHRITIS OF BOTH KNEES: Primary | ICD-10-CM

## 2025-05-06 DIAGNOSIS — M25.561 CHRONIC PAIN OF BOTH KNEES: ICD-10-CM

## 2025-05-06 PROCEDURE — 20611 DRAIN/INJ JOINT/BURSA W/US: CPT | Mod: S$PBB,RT,, | Performed by: PHYSICAL MEDICINE & REHABILITATION

## 2025-05-06 PROCEDURE — 99214 OFFICE O/P EST MOD 30 MIN: CPT | Mod: S$PBB,25,, | Performed by: PHYSICAL MEDICINE & REHABILITATION

## 2025-05-06 RX ORDER — LIDOCAINE HYDROCHLORIDE 10 MG/ML
3 INJECTION, SOLUTION INFILTRATION; PERINEURAL
Status: DISCONTINUED | OUTPATIENT
Start: 2025-05-06 | End: 2025-05-06 | Stop reason: HOSPADM

## 2025-05-06 RX ORDER — TRIAMCINOLONE ACETONIDE 40 MG/ML
40 INJECTION, SUSPENSION INTRA-ARTICULAR; INTRAMUSCULAR
Status: DISCONTINUED | OUTPATIENT
Start: 2025-05-06 | End: 2025-05-06 | Stop reason: HOSPADM

## 2025-05-06 RX ADMIN — LIDOCAINE HYDROCHLORIDE 3 ML: 10 INJECTION, SOLUTION INFILTRATION; PERINEURAL at 11:05

## 2025-05-06 RX ADMIN — TRIAMCINOLONE ACETONIDE 40 MG: 40 INJECTION, SUSPENSION INTRA-ARTICULAR; INTRAMUSCULAR at 11:05

## 2025-05-06 NOTE — PROCEDURES
Large Joint Aspiration/Injection: R knee    Date/Time: 5/6/2025 11:40 AM    Performed by: Marlene Molina MD  Authorized by: Mralene Molina MD    Consent Done?:  Yes (Written)  Indications:  Arthritis  Site marked: the procedure site was marked    Timeout: prior to procedure the correct patient, procedure, and site was verified    Prep: patient was prepped and draped in usual sterile fashion    Local anesthesia used?: No      Details:  Needle Size:  25 G  Ultrasonic Guidance for needle placement?: Yes    Images are saved and documented.  Approach:  Lateral  Location:  Knee  Site:  R knee  Medications:  3 mL LIDOcaine HCL 10 mg/ml (1%) 10 mg/mL (1 %); 40 mg triamcinolone acetonide 40 mg/mL  Patient tolerance:  Patient tolerated the procedure well with no immediate complications

## 2025-05-06 NOTE — PROGRESS NOTES
[facility]      Chief Complaint:   Chief Complaint   Patient presents with    Knee Pain       History of Present Illness: Alexsandra Garcia is a 69 y.o. female referred by Eloisa Yusuf for neck pain.      Neck pain:  Onset: 1 year. She had prior C7-T1 anterior fusion   Location: neck  Radiation: back and bilateral shoulders  Timing: intermittent  Quality: Aching, Deep, and Stabbing  Exacerbating Factors: general activity  Alleviating Factors: massage and exercise/therapy  Associated Symptoms: She feels weakness in her arms and legs and numbness in both fingertips. She denies night fever/night sweats, urinary incontinence/change in function, bowel incontinence/change in function, and unexplained weight loss    Functional Limitations: Pain is limiting her driving, sleeping, getting dressed.    Patient has failed > 6 weeks of conservative treatment including: physical therapy and oral medications. She has been in PT for about a year.     Severity: Currently: 4/10   Typical Range: 4-10/10     Exacerbation: 10/10     P = 8  E = 6  G = 6  Baseline PEG Score = 6.67    Opioid Risk Score         Value Time User    Opioid Risk Score  1 3/20/2024  1:21 PM Ketty Ceballos LPN           Previous Therapies:  PT/OT: yes   Relevant Surgery: yes    - C7-T1 anterior fusion    - Right hip replacement  Previous Medications:   - NSAIDS: tylenol.  - Muscle Relaxants: tizanidine   - TCAs: Elavil   - SNRIs: duloxetine   - Topicals:   - Anticonvulsants:    - Opioids: Hydrocodone       Previous Interventions:  - 3/31/25: B/L L4-5, L5-S1 diagnostic MBB w/ 100% relief  - 4/29/24: C7-T1 IL JANETTE with 90% relief    Interval History (09/03/2024):   Back pain in bilateral low back and does not radiate into legs. Pain worse with flexion more than with extension. No new weakness or numbness. No changes in b/b function.      Interval History (03/21/2025):  Right Knee pain has worsened in the past 2 weeks. Denies traumatic onset, but noticed  onset of pain after prolonged walking in [place] within the past month. Pain is localized to the medial and lateral aspects of right knee. Pain is described as aching, stabbing. The pain is aggravated by walking, movement, walking up steps. The pain is alleviated by rest. Reports weakness. Denies numbness. (+) swelling. (+) popping. (-) locking, catching. (+) giving way. (+) feelings of instability. Denies recent fevers or infections. Denies unexplained weight loss.     Interval History (04/01/2025):  Alexsandra Garcia returns today for follow up.  At the last clinic visit, scheduled diagnostic MBB     The bilateral L4-5, L5-S1 diagnostic MBB provided 100% relief (Pain scale went from 6/10 pre-procedure to post-procedure pain of 0/10 during anesthetic effect for several hrs following procedure).     Currently, the low back pain is stable.  However, her knee pain is the worst. She would rather focus on that today. She is feeling pain in both knees.    The baclofen is helping and tizanidine is helping and are tolerated.     Current Pain Scales:  Current: 1/10              Average: 5/10  Least-Worst: 4-8/10    Current PEG Score: 6.33     Interval History (05/06/2025):  Alexsandra Garcia returns today for follow up.  At the last clinic visit, return to clinic for right knee injection.    Currently, the right knee pain is worse and more swollen.  The knee pain is unchanged in character or location. Denies any changes in bowel or bladder function. Denies any new weakness or new numbness since the most recent visit. Denies any fevers or recent infections/antibiotics. Denies any unexplained weight loss.     Back pain still improved, but did have some more pain around her waist.    Current Pain Scales:  Current: 5/10              Average: 5/10  Least-Worst: 4-8/10           5/6/2025    12:09 PM   Last 3 PDI Scores   Pain Disability Index (PDI) 36            Current Pain Medications:  Duloxetine 60 mg  Norco   mg  Zanaflex 2 mg BID  Elavil 25 mg   Baclofen    Blood Thinners: none    Full Medication List:    Current Outpatient Medications:     baclofen (LIORESAL) 5 mg Tab tablet, Take 1 tablet (5 mg total) by mouth 2 (two) times daily as needed (back pain)., Disp: 60 tablet, Rfl: 2    cetirizine (ZYRTEC) 10 MG tablet, Take 10 mg by mouth once daily., Disp: , Rfl:     cholecalciferol, vitamin D3, (VITAMIN D3) 25 mcg (1,000 unit) capsule, Take 1,000 Units by mouth once daily., Disp: , Rfl:     DULoxetine (CYMBALTA) 60 MG capsule, Take 60 mg by mouth once daily., Disp: , Rfl:     famotidine (PEPCID) 40 MG tablet, , Disp: , Rfl:     fluticasone propionate (FLONASE) 50 mcg/actuation nasal spray, 100 mcg by Nasal route., Disp: , Rfl:     HYDROcodone-acetaminophen (NORCO)  mg per tablet, hydrocodone 10 mg-acetaminophen 325 mg tablet, Disp: , Rfl:     hydrOXYzine pamoate (VISTARIL) 25 MG Cap, Take 25 mg by mouth daily as needed., Disp: , Rfl:     metoprolol succinate (TOPROL-XL) 25 MG 24 hr tablet, Take 12.5 mg by mouth once daily., Disp: , Rfl:     montelukast (SINGULAIR) 10 mg tablet, Take 10 mg by mouth every evening., Disp: , Rfl:     multivitamin (THERAGRAN) tablet, Take 1 tablet by mouth once daily., Disp: , Rfl:     nitrofurantoin, macrocrystal-monohydrate, (MACROBID) 100 MG capsule, Take 100 mg by mouth once daily., Disp: , Rfl:     pantoprazole (PROTONIX) 40 MG tablet, Take 40 mg by mouth., Disp: , Rfl:     spironolactone (ALDACTONE) 25 MG tablet, Take 25 mg by mouth once daily., Disp: , Rfl:     tiZANidine (ZANAFLEX) 4 MG tablet, Take 0.5-1 tablets (2-4 mg total) by mouth nightly as needed (spasm, pain)., Disp: 60 tablet, Rfl: 2    hydrOXYzine HCL (ATARAX) 25 MG tablet, Take 25 mg by mouth 3 (three) times daily. (Patient not taking: Reported on 5/6/2025), Disp: , Rfl:     rosuvastatin (CRESTOR) 20 MG tablet, Take 20 mg by mouth once daily., Disp: , Rfl:      Review of Systems: See HPI    Allergies:  Penicillins  "    Medical History:   has a past medical history of Anxiety, Arthritis, GERD (gastroesophageal reflux disease), Pulmonary stenosis, and Urinary tract infection.    Surgical History:   has a past surgical history that includes Hip surgery (Right, 2020); Neck surgery (1979); Neck surgery (1983); Foot surgery (Left, 2019); and Elbow surgery (Right, 1980).    Family History:  family history includes Bladder Cancer in her father; Breast cancer in her mother; Cancer in her father and mother.    Social History:   reports that she has never smoked. She has never used smokeless tobacco. She reports current alcohol use. She reports current drug use. Drug: Marijuana.    Physical Exam:  /72   Pulse 71   Ht 5' 6" (1.676 m)   Wt 59 kg (130 lb)   SpO2 98%   BMI 20.98 kg/m²   GEN: No acute distress. Calm, comfortable  HENT: Normocephalic, atraumatic, moist mucous membranes  EYE: Anicteric sclera, non-injected.   CV: Non-diaphoretic. Regular Rate. Radial Pulses 2+.  RESP: Breathing comfortably. Chest expansion symmetric.  EXT: No clubbing, cyanosis.   SKIN: Warm, & dry to palpation. No visible rashes or lesions of exposed skin.   PSYCH: Pleasant mood and appropriate affect. Recent and remote memory intact.   GAIT: Independent, normal ambulation  Neck Exam:       Inspection: No erythema, bruising. Forward head and rounded shoulders      Palpation: (+) TTP of b/l cervical paraspinals, trapezius, rhomboids, levators, supraspinatus      ROM:  Limitation in all planes      Provocative Maneuvers:  (-) Spurling's bilaterally  Shoulder Exam:       Inspection: No erythema, bruising.       Palpation: (+) TTP of b/l subacromial space, b/l AC joints, b/l proximal biceps tendons.       ROM:  Limited in abduction, internal rotation b/l      Provocative Maneuvers:  (+) Hawkin's b/l       (+) Empty Can b/l  Lumbar Spine Exam:       Inspection: No erythema, bruising.       Palpation: (+) TTP of lumbar paraspinals and SIJ b/l      ROM:  " Limited in flexion, extension, lateral bending.    Pain w/ flex > ext      (+) Facet loading bilaterally      (-) Straight Leg Raise bilaterally      (-) MORALES bilaterally  (+) Gaenslan's Test bilaterally  (+) Thigh thrust/Posterior Pelvic Pain Provocation test on right  (+) Sacral thrust w/ pain bilaterally   (+) Yeoman's test bilaterally  Hip Exam:      Inspection: No gross deformity or apparent leg length discrepancy      Palpation:  No TTP to bilateral greater trochanteric bursas, piriformis.       ROM:  No limitation or pain in internal rotation, external rotation b/l  Knee Exam:     Inspection: Effusion at right knee. No swelling on left, erythema, ecchymoses, or gross deformity.     Palpation: (+) TTP at medial joint line, lateral joint line on right     ROM: (-) Limitation in extension b/l. (+) Limitation in flexion on righ.      (+) Crepitus on right     Ligamentous Laxity: None apparent with Lachman, Posterior drawer, Varus/Valgus stress  Neurologic Exam:     Alert. Speech is fluent and appropriate.     Strength: 4/5 in b/l hip flexion, otherwise 5/5 throughout bilateral upper & lower extremities     Sensation:  Grossly intact to light touch in bilateral upper & lower extremities     Reflexes: 1+ in b/l patella, achilles, biceps, brachioradialis, triceps     Tone: No abnormality appreciated in bilateral upper or lower extremities     (-) Jones bilaterally     No Clonus      Imaging:  -MRI Right knee 3/13/25:           - X-ray Bilateral knees 3/21/25:   3 views of both knees.  Medial lateral compartment are maintained bilaterally.  Small osteophytes involving the medial compartment on the right.  Tiny patellar osteophytes bilaterally.  No joint effusion.  No acute fracture or dislocation.     -MRI Lumbar Spine 9/18/24        - X-ray lumbar spine complete w/ flex/ext 5/16/24:  There is mild levoscoliosis of the lumbar spine. Vertebral body heights are within normal limits. There is slight retrolisthesis of  L2 on L3 and L3 on L4. There is moderate disc height loss from L2-3 through L5-S1. Mild disc height loss at L1-2. Multilevel facet arthropathy suspected. No fractures visualized. Visualized osseous structures are diffusely osteopenic. Prior right hip arthroplasty noted.     - X-ray lumbar spine 3/20/24:  There is mild levoscoliosis of the lumbar spine. Vertebral body heights are within normal limits. There is slight retrolisthesis of L2 on L3 and L3 on L4. There is moderate disc height loss from L2-3 through L5-S1. Mild disc height loss at L1-2. Multilevel facet arthropathy suspected. No fractures visualized. Visualized osseous structures are diffusely osteopenic. Prior right hip arthroplasty noted.     - X-ray cervical spine 3/20/24:  The patient is status post ACDF of C7-T1. Cervical lordosis appears somewhat accentuated. Vertebral body heights are within normal limits. There is slight retrolisthesis of C5 on C6. Moderate to severe disc height loss at C5-6. Mild-to-moderate disc height loss at C6-7. Probable mild disc height loss at C2-3 and C4-5. Multilevel bilateral facet arthropathy suspected. No fractures demonstrated. Odontoid process appears intact. Atlantoaxial articulations appear normal. Visualized osseous structures are diffusely osteopenic. Prevertebral soft tissues appear within normal limits.     - X-ray b/l shoulders 3/20/24:  There is no radiographic evidence of acute osseous, articular, or soft tissue abnormality. There is mild-to-moderate bilateral AC joint arthrosis. Bilateral glenohumeral osteoarthritis also noted, moderate on the left and mild on the right. Periarticular calcific density seen adjacent to the proximal left humeral diaphysis may potentially represent an intra-articular body within the biceps tendon sheath. Postoperative findings are present in the visualized cervical spine. Visualized osseous structures appear diffusely osteopenic.     - MRI Cervical Spine 2/26/24:  Normal  anatomic alignment.  Postop changes from C7-T1 ACDF.  Cervical spinal cord shows normal shape, volume and signal.  C1-C2:Anterior longitudinal interval degenerative arthritis and retrograde and ligamentous thickening. C1-C2 facet arthritis  C2-C3:Hypertrophic degenerative facet arthropathy much worse on the right side. 2 mm disc osteophyte bulge. The ligament flavum thickening is effacing the dorsal subarachnoid space and contacting the cord, but there is adequate CSF dorsal to the cord and overall no cord compression. Severe right foraminal stenosis. Mild left foraminal stenosis.  C3-C4:2 mm disc osteophyte bulge and ligament flavum hypertrophy produce mild central canal stenosis without any cord compression. Bilateral hypertrophic degenerative facet arthropathy and uncovertebral joint osteophytes more evident on the right side produces moderate right and mild left foraminal stenosis.  C4-C5:Moderate desiccation and degenerative loss in height of the disc. Left-sided hypertrophic degenerative facet arthropathy and uncovertebral joint osteophyte produces severe left neural foraminal stenosis. No central canal stenosis.  C5-C6:Moderate desiccation and degenerative loss in height of the disc. A 3.5 mm disc osteophyte bulge is slightly asymmetric to the right side along with ligamentum flavum hypertrophy produces mild/moderate central canal stenosis but no cord compression. Very large right uncovertebral joint osteophyte produces severe right neural foraminal stenosis along with degenerative facet arthropathy. Small left uncovertebral joint osteophyte producing mild left foraminal stenosis.  C6-C7:Mild degenerative loss in height of the disc. 3 mm disc osteophyte bulge producing mild central canal stenosis without any cord compression. Bilateral uncovertebral joint osteophytes and mild hypertrophic degenerative facet arthropathy, worse on the right produces moderate right and mild left foraminal  stenosis.  C7-T1:Central canal and neural foramina are well decompressed.  T1-T2: Mild degenerative facet arthropathy without any stenosis.      -MRI Lumbar Spine 12/22/21  Alignment: Levoscoliosis of 19 degrees is seen with the apex being at approximately the L2 level.  Vertebral bodies: Normal height and marrow signal. No transitional anatomy.  T12-L1: The disc is normal. The facets are normal. The central canal and neural foramen are normally maintained. No displacement or compression of the neural elements are seen.  L1-L2: There is loss of disc space height with desiccation. A concentric bulge measuring approximately 3 mm present. Degenerative changes are present in the facets consisting mainly of hypertrophic changes. There is mild narrowing of the central canal. Mild narrowing of the neural foramen is present bilaterally at the disc level. No displacement or compression of the neural elements are seen.  L2-L3: Loss of disc space height with desiccation is present. There is an approximate 2-3 mm concentric disc bulge. Degenerative changes are present in the facets. Moderate narrowing of the central canal is present. There is mild narrowing of the left neural foramen at the disc level. Moderate narrowing of the right neural foramen at both the infrapedicular and disc level is seen. No displacement or compression of the neural elements are seen.  L3-L4: Loss of disc space height with desiccation is present. There is an approximate 4 to 5 mm eccentric disc bulge lateralizing to the patient's right side. Degenerative changes are present in the facets. There is moderate narrowing of the central canal. Moderate to severe narrowing of the right subarticular recess is seen. There is mild narrowing of the right left neural foramen at the disc level. The exiting nerve root is normally maintained. Moderate narrowing of the right neural foramen at both the infrapedicular and disc level is seen. The exiting nerve root on  "this side appears normally maintained as well.  L4-L5: Loss of disc space height with desiccation is present. Eccentric disc bulge lateralizing to the patient's left side is seen. This measures a maximum of approximately 4 to 5 mm. Degenerative changes are present in the facets consisting mainly of hypertrophic changes. There is mild to moderate narrowing of the central canal. Moderate narrowing of the neural foramen is seen on the left at both the  infrapedicular and disc level. The right neural foramen appears fairly well-maintained. No displacement or compression of the neural elements are seen.  L5-S1: Loss of disc space height with desiccation is seen. There is a disc bulge present measuring approximately 5-6 mm. Degenerative changes are present in the facets consisting mainly of hypertrophic changes. There is mild narrowing of the central canal. Moderate narrowing of the neural foramen is present bilaterally at both the infrapedicular and disc level. No displacement or compression of the neural elements are seen.  Spinal cord: The cord extends down to the L1 level. The cord has a normal size, configuration and signal pattern.  Additional findings: None seen.      Labs:  BMP  No results found for: "NA", "K", "CL", "CO2", "BUN", "CREATININE", "CALCIUM", "ANIONGAP", "EGFRNORACEVR"  No results found for: "ALT", "AST", "GGT", "ALKPHOS", "BILITOT"  No results found for: "PLT"    Assessment:  Alexsandra Garcia is a 69 y.o. female with the following diagnoses based on history, exam, and imagin. Primary osteoarthritis of both knees  -     Large Joint Aspiration/Injection: R knee    2. Chronic pain of both knees  -     Large Joint Aspiration/Injection: R knee            This is a pleasant 69 y.o. lady presenting with:     - Chronic neck pain and bilateral shoulder/arm pains:   - Prior C7-T1 ACDF.   - On imaging, there is severe foraminal stenosis on the right at C5-6 and on the left at C4-5 as well as diffuse " facet arthropathy, appears most significant at C4-5 on the left and C5-6 on the right.   - Chronic bilateral shoulder pain:   - Multifactorial with contributions from C-spine as well.   - She has signs of shoulder impingement, AC joint arthropathy   - Chronic bilateral low back pain and some posterior thigh pain:   - I suspect her pain is primarily related to her facet joint arthropathy. There is pain localized over SIJ, but negative MORALES b/l. Potentially disc vs endplate pain     - Patient with moderate to severe chronic low back pain that is predominantly axial with no radicular component or neurogenic claudication.. The pain is severe enough to greatly impact quality of life &/or function as evidenced on the patients PEG score and NRS.    - Pain persists for greater than 3 months despite conservative treatment, including: Activity modification (avoiding exacerbating factors), physical therapy, physician-led home exercise program, and oral medications.    - Facet joints currently suspected as primary pain generator based on clinical assessment & radiology studies, as there is no fracture, tumor, infection, and significant deformity. There is NO surgical fusion (such as Anterior Lumbar Interbody Fusion) at the spinal segment to be targeted..   - This facet level has not undergone ablation in the previous 2 years.     - Patient reports greater than or equal to 80% relief from previous MBB at this segment with preprocedure pain score noted as 6/10 and post-procedure pain score noted as 0/10 with consistent relief for duration of local anesthetic (2-3 hrs).   - She was able to move around better and wants to re-attempt PT to also help with her knee pain.   - Bilateral knee pain:   - OA, primarily patellofemoral.    - Right knee meniscal tear, ITB syndrome  - Comorbidities: Osteoporosis on Prolia infusions. Recurrent UTI on daily abx. GERD. HTN. Atrial septal defect. Anxiety. Pulmonary stenosis.     Treatment Plan:    - PT/OT/HEP: Cont PT prior to #2/2 MBB.   - Cont HEP for neck and back pain. Discussed benefits of exercise for pain.   - Procedures: Performed right knee CSI w/ US guidance.   - If back pain persists despite PT, plan for #2/2 diagnostic medial branch block of bilateral L4/5 & L5/S1 facet joints under fluoroscopic guidance with local sedation. (CPT Codes 56667, 45451, KX modifier). If MBB successful x 2, plan for RFA. The patient is not allergic to iodinated contrast. Patient is not currently taking blood thinners for cardiovascular prophylaxis   - Consider knee durolane or synvisc-1 injection  - Consider Intracept at L2-S1   - If limited relief, consider bl SIJ  - Can repeat C7-T1 IL JANETTE with local sedation PRN   - If limited relief, plan for bilateral C4-5, C5-6 diagnostic MBBs.  - Medications:    - Cont baclofen 5 mg BID PRN  - Cont tizanidine 2-4 mg qHS to help with sleep and pain.   - Imaging: Reviewed.   - Requested addendum to MRI lumbar spine to include modic 2 changes at L2-3, L3-4, L4-5.  - Labs: Reviewed. Medications are appropriately dosed for current hepatorenal function.      Follow Up: RTC in 6-8 weeks after PT or sooner PRN      Marlene Molina MD  Interventional Pain Medicine

## 2025-05-27 ENCOUNTER — OFFICE VISIT (OUTPATIENT)
Dept: PAIN MEDICINE | Facility: CLINIC | Age: 70
End: 2025-05-27
Payer: MEDICARE

## 2025-05-27 VITALS
HEART RATE: 67 BPM | OXYGEN SATURATION: 98 % | WEIGHT: 130 LBS | DIASTOLIC BLOOD PRESSURE: 60 MMHG | BODY MASS INDEX: 20.89 KG/M2 | HEIGHT: 66 IN | SYSTOLIC BLOOD PRESSURE: 96 MMHG

## 2025-05-27 DIAGNOSIS — M17.0 PRIMARY OSTEOARTHRITIS OF BOTH KNEES: ICD-10-CM

## 2025-05-27 DIAGNOSIS — M54.50 CHRONIC BILATERAL LOW BACK PAIN WITHOUT SCIATICA: ICD-10-CM

## 2025-05-27 DIAGNOSIS — M47.816 LUMBAR SPONDYLOSIS: Primary | ICD-10-CM

## 2025-05-27 DIAGNOSIS — M22.2X1 PATELLOFEMORAL PAIN SYNDROME OF BOTH KNEES: ICD-10-CM

## 2025-05-27 DIAGNOSIS — M54.51 VERTEBROGENIC LOW BACK PAIN: ICD-10-CM

## 2025-05-27 DIAGNOSIS — G89.29 CHRONIC PAIN OF BOTH KNEES: ICD-10-CM

## 2025-05-27 DIAGNOSIS — G89.29 CHRONIC BILATERAL LOW BACK PAIN WITHOUT SCIATICA: ICD-10-CM

## 2025-05-27 DIAGNOSIS — M22.2X2 PATELLOFEMORAL PAIN SYNDROME OF BOTH KNEES: ICD-10-CM

## 2025-05-27 DIAGNOSIS — M25.561 CHRONIC PAIN OF BOTH KNEES: ICD-10-CM

## 2025-05-27 DIAGNOSIS — M25.562 CHRONIC PAIN OF BOTH KNEES: ICD-10-CM

## 2025-05-27 PROCEDURE — 99214 OFFICE O/P EST MOD 30 MIN: CPT | Mod: S$PBB,,, | Performed by: PHYSICIAN ASSISTANT

## 2025-05-27 NOTE — PROGRESS NOTES
[facility]      Chief Complaint:   Chief Complaint   Patient presents with    Knee Pain       History of Present Illness: Alexsandra Garcia is a 69 y.o. female referred by Eloisa Yusuf for neck pain.      Neck pain:  Onset: 1 year. She had prior C7-T1 anterior fusion   Location: neck  Radiation: back and bilateral shoulders  Timing: intermittent  Quality: Aching, Deep, and Stabbing  Exacerbating Factors: general activity  Alleviating Factors: massage and exercise/therapy  Associated Symptoms: She feels weakness in her arms and legs and numbness in both fingertips. She denies night fever/night sweats, urinary incontinence/change in function, bowel incontinence/change in function, and unexplained weight loss    Functional Limitations: Pain is limiting her driving, sleeping, getting dressed.    Patient has failed > 6 weeks of conservative treatment including: physical therapy and oral medications. She has been in PT for about a year.     Severity: Currently: 4/10   Typical Range: 4-10/10     Exacerbation: 10/10     P = 8  E = 6  G = 6  Baseline PEG Score = 6.67    Opioid Risk Score         Value Time User    Opioid Risk Score  1 3/20/2024  1:21 PM Ketty Ceballos LPN           Previous Therapies:  PT/OT: yes   Relevant Surgery: yes    - C7-T1 anterior fusion    - Right hip replacement  Previous Medications:   - NSAIDS: tylenol.  - Muscle Relaxants: tizanidine   - TCAs: Elavil   - SNRIs: duloxetine   - Topicals:   - Anticonvulsants:    - Opioids: Hydrocodone       Previous Interventions:  - 3/31/25: B/L L4-5, L5-S1 diagnostic MBB w/ 100% relief  - 4/29/24: C7-T1 IL JANETTE with 90% relief    Interval History (09/03/2024):   Back pain in bilateral low back and does not radiate into legs. Pain worse with flexion more than with extension. No new weakness or numbness. No changes in b/b function.      Interval History (03/21/2025):  Right Knee pain has worsened in the past 2 weeks. Denies traumatic onset, but noticed  onset of pain after prolonged walking in [place] within the past month. Pain is localized to the medial and lateral aspects of right knee. Pain is described as aching, stabbing. The pain is aggravated by walking, movement, walking up steps. The pain is alleviated by rest. Reports weakness. Denies numbness. (+) swelling. (+) popping. (-) locking, catching. (+) giving way. (+) feelings of instability. Denies recent fevers or infections. Denies unexplained weight loss.     Interval History (04/01/2025):  Alexsandra Garcia returns today for follow up.  At the last clinic visit, scheduled diagnostic MBB     The bilateral L4-5, L5-S1 diagnostic MBB provided 100% relief (Pain scale went from 6/10 pre-procedure to post-procedure pain of 0/10 during anesthetic effect for several hrs following procedure).     Currently, the low back pain is stable.  However, her knee pain is the worst. She would rather focus on that today. She is feeling pain in both knees.    The baclofen is helping and tizanidine is helping and are tolerated.     Current Pain Scales:  Current: 1/10              Average: 5/10  Least-Worst: 4-8/10    Current PEG Score: 6.33     Interval History (05/06/2025):  Alexsandra Garcia returns today for follow up.  At the last clinic visit, return to clinic for right knee injection.    Currently, the right knee pain is worse and more swollen.  The knee pain is unchanged in character or location. Denies any changes in bowel or bladder function. Denies any new weakness or new numbness since the most recent visit. Denies any fevers or recent infections/antibiotics. Denies any unexplained weight loss.     Back pain still improved, but did have some more pain around her waist.    Current Pain Scales:  Current: 5/10              Average: 5/10  Least-Worst: 4-8/10           5/27/2025     8:17 AM 5/6/2025    12:09 PM   Last 3 PDI Scores   Pain Disability Index (PDI) 13 36     Interval History (05/27/2025):  Alexsandra ROGERS  Radha returns today for follow up.  At the last clinic visit, Performed right knee CSI w/ US guidance.   Cont baclofen 5 mg BID PRN, Cont tizanidine 2-4 mg qHS to help with sleep and pain. Requested addendum to MRI lumbar spine to include modic 2 changes at L2-3, L3-4, L4-5.    Right knee CSI provided 55% relief.     Currently, the knee pain is improved.      She continues with PT for lower back and knees, which is providing > 50% relief in pain.     Currently, the lower back pain is improved, not currently causing limitation for her at this time. Denies any changes in bowel or bladder function. Denies any new weakness or new numbness since the most recent visit. Denies any fevers or recent infections/antibiotics. Denies any unexplained weight loss.     She continues baclofen and Tizandine, which are providing benefit without side effects.      Current Pain Scales:  Current: 2/10              Average: 3/10  Least-Worst: 0-5/10           5/27/2025     8:17 AM 5/6/2025    12:09 PM   Last 3 PDI Scores   Pain Disability Index (PDI) 13 36           Current Pain Medications:  Duloxetine 60 mg  Norco  mg  Zanaflex 2 mg BID  Elavil 25 mg   Baclofen    Blood Thinners: none    Full Medication List:    Current Outpatient Medications:     baclofen (LIORESAL) 5 mg Tab tablet, Take 1 tablet (5 mg total) by mouth 2 (two) times daily as needed (back pain)., Disp: 60 tablet, Rfl: 2    cetirizine (ZYRTEC) 10 MG tablet, Take 10 mg by mouth once daily., Disp: , Rfl:     cholecalciferol, vitamin D3, (VITAMIN D3) 25 mcg (1,000 unit) capsule, Take 1,000 Units by mouth once daily., Disp: , Rfl:     DULoxetine (CYMBALTA) 60 MG capsule, Take 60 mg by mouth once daily., Disp: , Rfl:     famotidine (PEPCID) 40 MG tablet, , Disp: , Rfl:     fluticasone propionate (FLONASE) 50 mcg/actuation nasal spray, 100 mcg by Nasal route., Disp: , Rfl:     HYDROcodone-acetaminophen (NORCO)  mg per tablet, hydrocodone 10 mg-acetaminophen 325 mg  "tablet, Disp: , Rfl:     hydrOXYzine pamoate (VISTARIL) 25 MG Cap, Take 25 mg by mouth daily as needed., Disp: , Rfl:     metoprolol succinate (TOPROL-XL) 25 MG 24 hr tablet, Take 12.5 mg by mouth once daily., Disp: , Rfl:     montelukast (SINGULAIR) 10 mg tablet, Take 10 mg by mouth every evening., Disp: , Rfl:     multivitamin (THERAGRAN) tablet, Take 1 tablet by mouth once daily., Disp: , Rfl:     nitrofurantoin, macrocrystal-monohydrate, (MACROBID) 100 MG capsule, Take 100 mg by mouth once daily., Disp: , Rfl:     pantoprazole (PROTONIX) 40 MG tablet, Take 40 mg by mouth., Disp: , Rfl:     spironolactone (ALDACTONE) 25 MG tablet, Take 25 mg by mouth once daily., Disp: , Rfl:     tiZANidine (ZANAFLEX) 4 MG tablet, Take 0.5-1 tablets (2-4 mg total) by mouth nightly as needed (spasm, pain)., Disp: 60 tablet, Rfl: 2    rosuvastatin (CRESTOR) 20 MG tablet, Take 20 mg by mouth once daily., Disp: , Rfl:      Review of Systems: See HPI    Allergies:  Penicillins     Medical History:   has a past medical history of Anxiety, Arthritis, GERD (gastroesophageal reflux disease), Pulmonary stenosis, and Urinary tract infection.    Surgical History:   has a past surgical history that includes Hip surgery (Right, 2020); Neck surgery (1979); Neck surgery (1983); Foot surgery (Left, 2019); and Elbow surgery (Right, 1980).    Family History:  family history includes Bladder Cancer in her father; Breast cancer in her mother; Cancer in her father and mother.    Social History:   reports that she has never smoked. She has never used smokeless tobacco. She reports current alcohol use. She reports current drug use. Drug: Marijuana.    Physical Exam:  BP 96/60 (Patient Position: Sitting)   Pulse 67   Ht 5' 6" (1.676 m)   Wt 59 kg (130 lb)   SpO2 98%   BMI 20.98 kg/m²   GEN: No acute distress. Calm, comfortable  HENT: Normocephalic, atraumatic, moist mucous membranes  EYE: Anicteric sclera, non-injected.   CV: Non-diaphoretic. " Regular Rate. Radial Pulses 2+.  RESP: Breathing comfortably. Chest expansion symmetric.  EXT: No clubbing, cyanosis.   SKIN: Warm, & dry to palpation. No visible rashes or lesions of exposed skin.   PSYCH: Pleasant mood and appropriate affect. Recent and remote memory intact.   GAIT: Independent, normal ambulation  Neck Exam:       Inspection: No erythema, bruising. Forward head and rounded shoulders      Palpation: (+) TTP of b/l cervical paraspinals, trapezius, rhomboids, levators, supraspinatus      ROM:  Limitation in all planes      Provocative Maneuvers:  (-) Spurling's bilaterally  Shoulder Exam:       Inspection: No erythema, bruising.       Palpation: (+) TTP of b/l subacromial space, b/l AC joints, b/l proximal biceps tendons.       ROM:  Limited in abduction, internal rotation b/l      Provocative Maneuvers:  (+) Hawkin's b/l       (+) Empty Can b/l  Lumbar Spine Exam:       Inspection: No erythema, bruising.       Palpation: (+) TTP of lumbar paraspinals and SIJ b/l      ROM:  Limited in flexion, extension, lateral bending.    Pain w/ flex > ext      (+) Facet loading bilaterally      (-) Straight Leg Raise bilaterally      (-) MORALES bilaterally  (+) Gaenslan's Test bilaterally  (+) Thigh thrust/Posterior Pelvic Pain Provocation test on right  (+) Sacral thrust w/ pain bilaterally   (+) Yeoman's test bilaterally  Hip Exam:      Inspection: No gross deformity or apparent leg length discrepancy      Palpation:  No TTP to bilateral greater trochanteric bursas, piriformis.       ROM:  No limitation or pain in internal rotation, external rotation b/l  Knee Exam:     Inspection: Effusion at right knee. No swelling on left, erythema, ecchymoses, or gross deformity.     Palpation: (+) TTP at medial joint line, lateral joint line on right     ROM: (-) Limitation in extension b/l. (+) Limitation in flexion on right.      (+) Crepitus on right     Ligamentous Laxity: None apparent with Lachman, Posterior drawer,  Varus/Valgus stress  Neurologic Exam:     Alert. Speech is fluent and appropriate.     Strength: 4/5 in b/l hip flexion, otherwise 5/5 throughout bilateral upper & lower extremities     Sensation:  Grossly intact to light touch in bilateral upper & lower extremities     Reflexes: 1+ in b/l patella, achilles, biceps, brachioradialis, triceps     Tone: No abnormality appreciated in bilateral upper or lower extremities     (-) Jones bilaterally     No Clonus      Imaging:  -MRI Right knee 3/13/25:           - X-ray Bilateral knees 3/21/25:   3 views of both knees.  Medial lateral compartment are maintained bilaterally.  Small osteophytes involving the medial compartment on the right.  Tiny patellar osteophytes bilaterally.  No joint effusion.  No acute fracture or dislocation.     -MRI Lumbar Spine 9/18/24        - X-ray lumbar spine complete w/ flex/ext 5/16/24:  There is mild levoscoliosis of the lumbar spine. Vertebral body heights are within normal limits. There is slight retrolisthesis of L2 on L3 and L3 on L4. There is moderate disc height loss from L2-3 through L5-S1. Mild disc height loss at L1-2. Multilevel facet arthropathy suspected. No fractures visualized. Visualized osseous structures are diffusely osteopenic. Prior right hip arthroplasty noted.     - X-ray lumbar spine 3/20/24:  There is mild levoscoliosis of the lumbar spine. Vertebral body heights are within normal limits. There is slight retrolisthesis of L2 on L3 and L3 on L4. There is moderate disc height loss from L2-3 through L5-S1. Mild disc height loss at L1-2. Multilevel facet arthropathy suspected. No fractures visualized. Visualized osseous structures are diffusely osteopenic. Prior right hip arthroplasty noted.     - X-ray cervical spine 3/20/24:  The patient is status post ACDF of C7-T1. Cervical lordosis appears somewhat accentuated. Vertebral body heights are within normal limits. There is slight retrolisthesis of C5 on C6. Moderate to  severe disc height loss at C5-6. Mild-to-moderate disc height loss at C6-7. Probable mild disc height loss at C2-3 and C4-5. Multilevel bilateral facet arthropathy suspected. No fractures demonstrated. Odontoid process appears intact. Atlantoaxial articulations appear normal. Visualized osseous structures are diffusely osteopenic. Prevertebral soft tissues appear within normal limits.     - X-ray b/l shoulders 3/20/24:  There is no radiographic evidence of acute osseous, articular, or soft tissue abnormality. There is mild-to-moderate bilateral AC joint arthrosis. Bilateral glenohumeral osteoarthritis also noted, moderate on the left and mild on the right. Periarticular calcific density seen adjacent to the proximal left humeral diaphysis may potentially represent an intra-articular body within the biceps tendon sheath. Postoperative findings are present in the visualized cervical spine. Visualized osseous structures appear diffusely osteopenic.     - MRI Cervical Spine 2/26/24:  Normal anatomic alignment.  Postop changes from C7-T1 ACDF.  Cervical spinal cord shows normal shape, volume and signal.  C1-C2:Anterior longitudinal interval degenerative arthritis and retrograde and ligamentous thickening. C1-C2 facet arthritis  C2-C3:Hypertrophic degenerative facet arthropathy much worse on the right side. 2 mm disc osteophyte bulge. The ligament flavum thickening is effacing the dorsal subarachnoid space and contacting the cord, but there is adequate CSF dorsal to the cord and overall no cord compression. Severe right foraminal stenosis. Mild left foraminal stenosis.  C3-C4:2 mm disc osteophyte bulge and ligament flavum hypertrophy produce mild central canal stenosis without any cord compression. Bilateral hypertrophic degenerative facet arthropathy and uncovertebral joint osteophytes more evident on the right side produces moderate right and mild left foraminal stenosis.  C4-C5:Moderate desiccation and degenerative  loss in height of the disc. Left-sided hypertrophic degenerative facet arthropathy and uncovertebral joint osteophyte produces severe left neural foraminal stenosis. No central canal stenosis.  C5-C6:Moderate desiccation and degenerative loss in height of the disc. A 3.5 mm disc osteophyte bulge is slightly asymmetric to the right side along with ligamentum flavum hypertrophy produces mild/moderate central canal stenosis but no cord compression. Very large right uncovertebral joint osteophyte produces severe right neural foraminal stenosis along with degenerative facet arthropathy. Small left uncovertebral joint osteophyte producing mild left foraminal stenosis.  C6-C7:Mild degenerative loss in height of the disc. 3 mm disc osteophyte bulge producing mild central canal stenosis without any cord compression. Bilateral uncovertebral joint osteophytes and mild hypertrophic degenerative facet arthropathy, worse on the right produces moderate right and mild left foraminal stenosis.  C7-T1:Central canal and neural foramina are well decompressed.  T1-T2: Mild degenerative facet arthropathy without any stenosis.      -MRI Lumbar Spine 12/22/21  Alignment: Levoscoliosis of 19 degrees is seen with the apex being at approximately the L2 level.  Vertebral bodies: Normal height and marrow signal. No transitional anatomy.  T12-L1: The disc is normal. The facets are normal. The central canal and neural foramen are normally maintained. No displacement or compression of the neural elements are seen.  L1-L2: There is loss of disc space height with desiccation. A concentric bulge measuring approximately 3 mm present. Degenerative changes are present in the facets consisting mainly of hypertrophic changes. There is mild narrowing of the central canal. Mild narrowing of the neural foramen is present bilaterally at the disc level. No displacement or compression of the neural elements are seen.  L2-L3: Loss of disc space height with  desiccation is present. There is an approximate 2-3 mm concentric disc bulge. Degenerative changes are present in the facets. Moderate narrowing of the central canal is present. There is mild narrowing of the left neural foramen at the disc level. Moderate narrowing of the right neural foramen at both the infrapedicular and disc level is seen. No displacement or compression of the neural elements are seen.  L3-L4: Loss of disc space height with desiccation is present. There is an approximate 4 to 5 mm eccentric disc bulge lateralizing to the patient's right side. Degenerative changes are present in the facets. There is moderate narrowing of the central canal. Moderate to severe narrowing of the right subarticular recess is seen. There is mild narrowing of the right left neural foramen at the disc level. The exiting nerve root is normally maintained. Moderate narrowing of the right neural foramen at both the infrapedicular and disc level is seen. The exiting nerve root on this side appears normally maintained as well.  L4-L5: Loss of disc space height with desiccation is present. Eccentric disc bulge lateralizing to the patient's left side is seen. This measures a maximum of approximately 4 to 5 mm. Degenerative changes are present in the facets consisting mainly of hypertrophic changes. There is mild to moderate narrowing of the central canal. Moderate narrowing of the neural foramen is seen on the left at both the  infrapedicular and disc level. The right neural foramen appears fairly well-maintained. No displacement or compression of the neural elements are seen.  L5-S1: Loss of disc space height with desiccation is seen. There is a disc bulge present measuring approximately 5-6 mm. Degenerative changes are present in the facets consisting mainly of hypertrophic changes. There is mild narrowing of the central canal. Moderate narrowing of the neural foramen is present bilaterally at both the infrapedicular and disc  "level. No displacement or compression of the neural elements are seen.  Spinal cord: The cord extends down to the L1 level. The cord has a normal size, configuration and signal pattern.  Additional findings: None seen.      Labs:  BMP  No results found for: "NA", "K", "CL", "CO2", "BUN", "CREATININE", "CALCIUM", "ANIONGAP", "EGFRNORACEVR"  No results found for: "ALT", "AST", "GGT", "ALKPHOS", "BILITOT"  No results found for: "PLT"    Assessment:  Alexsandra Garcia is a 69 y.o. female with the following diagnoses based on history, exam, and imagin. Lumbar spondylosis    2. Chronic bilateral low back pain without sciatica    3. Vertebrogenic low back pain    4. Chronic pain of both knees    5. Primary osteoarthritis of both knees    6. Patellofemoral pain syndrome of both knees              This is a pleasant 69 y.o. lady presenting with:     - Chronic neck pain and bilateral shoulder/arm pains:   - Prior C7-T1 ACDF.   - On imaging, there is severe foraminal stenosis on the right at C5-6 and on the left at C4-5 as well as diffuse facet arthropathy, appears most significant at C4-5 on the left and C5-6 on the right.   - Chronic bilateral shoulder pain:   - Multifactorial with contributions from C-spine as well.   - She has signs of shoulder impingement, AC joint arthropathy   - Chronic bilateral low back pain and some posterior thigh pain:   - I suspect her pain is primarily related to her facet joint arthropathy. There is pain localized over SIJ, but negative MORALES b/l. Potentially disc vs endplate pain     - Patient with moderate to severe chronic low back pain that is predominantly axial with no radicular component or neurogenic claudication.. The pain is severe enough to greatly impact quality of life &/or function as evidenced on the patients PEG score and NRS.    - Pain persists for greater than 3 months despite conservative treatment, including: Activity modification (avoiding exacerbating factors), " physical therapy, physician-led home exercise program, and oral medications.    - Facet joints currently suspected as primary pain generator based on clinical assessment & radiology studies, as there is no fracture, tumor, infection, and significant deformity. There is NO surgical fusion (such as Anterior Lumbar Interbody Fusion) at the spinal segment to be targeted..   - This facet level has not undergone ablation in the previous 2 years.     - Patient reports greater than or equal to 80% relief from previous MBB at this segment with preprocedure pain score noted as 6/10 and post-procedure pain score noted as 0/10 with consistent relief for duration of local anesthetic (2-3 hrs).   - She was able to move around better and wants to re-attempt PT to also help with her knee pain.   - Bilateral knee pain:   - OA, primarily patellofemoral.    - Right knee meniscal tear, ITB syndrome  - Comorbidities: Osteoporosis on Prolia infusions. Recurrent UTI on daily abx. GERD. HTN. Atrial septal defect. Anxiety. Pulmonary stenosis.     Treatment Plan:   - PT/OT/HEP: Cont PT for back and knees.  - Cont HEP for neck and back pain. Discussed benefits of exercise for pain.   - Procedures:   - When back pain is more limiting, plan for #2/2 diagnostic medial branch block of bilateral L4/5 & L5/S1 facet joints under fluoroscopic guidance with local sedation. (CPT Codes 75391, 43719, KX modifier). If MBB successful x 2, plan for RFA. The patient is not allergic to iodinated contrast. Patient is not currently taking blood thinners for cardiovascular prophylaxis  - Can repeat right knee CSI in the future PRN   - Consider knee durolane or synvisc-1 injection  - Consider Intracept at L2-S1   - If limited relief, consider bl SIJ  - Can repeat C7-T1 IL JANETTE with local sedation PRN   - If limited relief, plan for bilateral C4-5, C5-6 diagnostic MBBs.  - Medications:    - Cont baclofen 5 mg BID PRN  - Cont tizanidine 2-4 mg qHS to help with sleep  and pain.   - Imaging: Reviewed.   - Re-Requested addendum to MRI lumbar spine to include modic 2 changes at L2-3, L3-4, L4-5.  - Labs: Reviewed. Medications are appropriately dosed for current hepatorenal function.      Follow Up: RTC 2 months or sooner PRLENY Acevedo PA-C  [facility]

## 2025-06-16 ENCOUNTER — TELEPHONE (OUTPATIENT)
Dept: PAIN MEDICINE | Facility: CLINIC | Age: 70
End: 2025-06-16
Payer: MEDICARE

## 2025-06-16 NOTE — TELEPHONE ENCOUNTER
Copied from CRM #[id number]. Topic: Appointments - Appointment Confirmation  >> Jun 16, 2025 10:12 AM Danya wrote:  Pt calling wanting to know where she went for the mri pt has appt to therapy and can be reached at [phone]     Thanks,

## 2025-07-07 ENCOUNTER — TELEPHONE (OUTPATIENT)
Dept: PAIN MEDICINE | Facility: CLINIC | Age: 70
End: 2025-07-07
Payer: MEDICARE

## 2025-07-07 NOTE — TELEPHONE ENCOUNTER
Copied from CRM #[id number]. Topic: Medications - Medication Question  >> Jul 7, 2025 12:31 PM So wrote:  Type:  Patient Requesting Call Back    Who Called:Alexsandra Garcia  Does the patient know what this is regarding?:name of medication that is provided for back injection  Would the patient rather a call back or a response via [facility]?   Best Call Back Number:[phone]  Additional Information: n/a

## 2025-07-08 ENCOUNTER — TELEPHONE (OUTPATIENT)
Dept: PAIN MEDICINE | Facility: CLINIC | Age: 70
End: 2025-07-08
Payer: MEDICARE

## 2025-07-08 NOTE — TELEPHONE ENCOUNTER
Copied from CRM #[id number]. Topic: General Inquiry - Patient Advice  >> Jul 8, 2025  8:23 AM Natacha wrote:  Patient is waiting on call back in regards to name of injection for heart repair.. please call her back at [phone]

## 2025-07-08 NOTE — TELEPHONE ENCOUNTER
Returned pt's call, unsure what injections she is referring to. Left vm for pt to call office back.